# Patient Record
Sex: MALE | Race: WHITE | NOT HISPANIC OR LATINO | Employment: OTHER | URBAN - NONMETROPOLITAN AREA
[De-identification: names, ages, dates, MRNs, and addresses within clinical notes are randomized per-mention and may not be internally consistent; named-entity substitution may affect disease eponyms.]

---

## 2022-10-24 ENCOUNTER — OFFICE VISIT (OUTPATIENT)
Dept: CARDIOLOGY | Facility: CLINIC | Age: 72
End: 2022-10-24

## 2022-10-24 VITALS
BODY MASS INDEX: 32.07 KG/M2 | OXYGEN SATURATION: 97 % | HEART RATE: 72 BPM | SYSTOLIC BLOOD PRESSURE: 121 MMHG | HEIGHT: 72 IN | DIASTOLIC BLOOD PRESSURE: 81 MMHG | WEIGHT: 236.8 LBS

## 2022-10-24 DIAGNOSIS — I48.92 PAROXYSMAL ATRIAL FLUTTER: ICD-10-CM

## 2022-10-24 DIAGNOSIS — E78.2 MIXED HYPERLIPIDEMIA: ICD-10-CM

## 2022-10-24 DIAGNOSIS — R06.09 DOE (DYSPNEA ON EXERTION): ICD-10-CM

## 2022-10-24 DIAGNOSIS — I10 HYPERTENSION, ESSENTIAL: Primary | ICD-10-CM

## 2022-10-24 DIAGNOSIS — E11.69 TYPE 2 DIABETES MELLITUS WITH OTHER SPECIFIED COMPLICATION, WITHOUT LONG-TERM CURRENT USE OF INSULIN: ICD-10-CM

## 2022-10-24 PROBLEM — E11.9 DIABETES MELLITUS: Status: ACTIVE | Noted: 2022-10-24

## 2022-10-24 PROCEDURE — 99204 OFFICE O/P NEW MOD 45 MIN: CPT | Performed by: INTERNAL MEDICINE

## 2022-10-24 PROCEDURE — 93000 ELECTROCARDIOGRAM COMPLETE: CPT | Performed by: INTERNAL MEDICINE

## 2022-10-24 RX ORDER — TRIAMCINOLONE ACETONIDE 55 UG/1
2 SPRAY, METERED NASAL DAILY
COMMUNITY

## 2022-10-24 RX ORDER — METOPROLOL SUCCINATE 25 MG/1
25 TABLET, EXTENDED RELEASE ORAL DAILY
COMMUNITY
End: 2022-10-24 | Stop reason: SDUPTHER

## 2022-10-24 RX ORDER — FLECAINIDE ACETATE 50 MG/1
50 TABLET ORAL 2 TIMES DAILY
Qty: 180 TABLET | Refills: 3 | Status: ON HOLD | OUTPATIENT
Start: 2022-10-24 | End: 2022-12-13 | Stop reason: SDUPTHER

## 2022-10-24 RX ORDER — FAMOTIDINE 10 MG
10 TABLET ORAL DAILY
COMMUNITY

## 2022-10-24 RX ORDER — KETOCONAZOLE 20 MG/G
1 CREAM TOPICAL AS NEEDED
COMMUNITY

## 2022-10-24 RX ORDER — FOLIC ACID 1 MG/1
1 TABLET ORAL DAILY
COMMUNITY

## 2022-10-24 RX ORDER — LISINOPRIL 10 MG/1
10 TABLET ORAL DAILY
COMMUNITY
End: 2023-03-06

## 2022-10-24 RX ORDER — MULTIVIT WITH MINERALS/LUTEIN
1000 TABLET ORAL DAILY
COMMUNITY

## 2022-10-24 RX ORDER — MELATONIN
2000 DAILY
COMMUNITY

## 2022-10-24 RX ORDER — ZOLPIDEM TARTRATE 10 MG/1
10 TABLET ORAL NIGHTLY PRN
COMMUNITY

## 2022-10-24 RX ORDER — ATORVASTATIN CALCIUM 10 MG/1
10 TABLET, FILM COATED ORAL NIGHTLY
COMMUNITY

## 2022-10-24 RX ORDER — METOPROLOL SUCCINATE 25 MG/1
25 TABLET, EXTENDED RELEASE ORAL DAILY
Qty: 90 TABLET | Refills: 3 | Status: SHIPPED | OUTPATIENT
Start: 2022-10-24 | End: 2023-01-24 | Stop reason: DRUGHIGH

## 2022-10-24 NOTE — PROGRESS NOTES
OFFICE VISIT  NOTE  [facility] CARDIOLOGY [place] [facility]      Name: Anibal GRANADOS    Date: 10/24/2022  MRN:  [id number]  :  1950      REFERRING/PRIMARY PROVIDER:  Freeman Smith MD    Chief Complaint   Patient presents with   • Atrial Flutter     Consult, referred by Dr. Smith       HPI: Anibal GRANADOS is a 72 y.o. male who presents today for new onset typical atrial flutter.  Diagnosed at PCP 10/18/2022 started on metoprolol and Eliquis.  Noticed on his Fitbit that his resting heart rate was elevated, going from the low 60s up to the mid 80s, when he exercised on treadmill heart rate going up to 200s with some associated shortness of breath.  History of hypertension, hyperlipidemia, CKD has 1 kidney.  His youngest son passed away this year he has increased stress.    Past Medical History:   Diagnosis Date   • Atrial fibrillation (HCC)    • Chronic kidney disease    • Diabetes mellitus (HCC)    • Hyperlipidemia    • Hypertension    • Kidney disease     One kidney       Past Surgical History:   Procedure Laterality Date   • CARPAL TUNNEL RELEASE      x2   • CYSTOSCOPY TRANSURETHRAL RESECTION OF PROSTATE     • NEPHRECTOMY     • TENDON REPAIR         Social History     Socioeconomic History   • Marital status:    Tobacco Use   • Smoking status: Never   • Smokeless tobacco: Never   Vaping Use   • Vaping Use: Never used   Substance and Sexual Activity   • Alcohol use: Yes     Comment: rarely   • Drug use: Never   • Sexual activity: Defer       Family History   Problem Relation Age of Onset   • Heart disease Mother         ROS:   Constitutional no fever,  no weight loss   Skin no rash, no subcutaneous nodules   Otolaryngeal no difficulty swallowing   Cardiovascular See HPI   Pulmonary no cough, no sputum production   Gastrointestinal no constipation, no diarrhea   Genitourinary no dysuria, no hematuria   Hematologic no easy bruisability, no abnormal bleeding   Musculoskeletal no  "muscle pain   Neurologic no dizziness, no falls         Allergies   Allergen Reactions   • Penicillins Other (See Comments)     Childhood allergy         Current Outpatient Medications:   •  apixaban (ELIQUIS) 5 MG tablet tablet, Take 1 tablet by mouth 2 (Two) Times a Day., Disp: , Rfl:   •  ascorbic acid (VITAMIN C) 1000 MG tablet, Take 1 tablet by mouth Daily., Disp: , Rfl:   •  atorvastatin (LIPITOR) 10 MG tablet, Take 1 tablet by mouth Daily., Disp: , Rfl:   •  Cholecalciferol 25 MCG (1000 UT) tablet, Take 2 tablets by mouth Daily., Disp: , Rfl:   •  famotidine (PEPCID) 10 MG tablet, Take 4 tablets by mouth Daily., Disp: , Rfl:   •  fluocinonide (LIDEX) 0.05 % cream, Apply 1 application topically to the appropriate area as directed As Needed., Disp: , Rfl:   •  folic acid (FOLVITE) 1 MG tablet, Take 1 tablet by mouth Daily., Disp: , Rfl:   •  ketoconazole (NIZORAL) 2 % cream, Apply 1 application topically to the appropriate area as directed As Needed for Itching., Disp: , Rfl:   •  lisinopril (PRINIVIL,ZESTRIL) 10 MG tablet, Take 1 tablet by mouth Daily., Disp: , Rfl:   •  metFORMIN (GLUCOPHAGE) 850 MG tablet, Take 1 tablet by mouth 2 (Two) Times a Day With Meals., Disp: , Rfl:   •  metoprolol succinate XL (TOPROL-XL) 25 MG 24 hr tablet, Take 1 tablet by mouth Daily., Disp: , Rfl:   •  Triamcinolone Acetonide (NASACORT) 55 MCG/ACT nasal inhaler, 2 sprays into the nostril(s) as directed by provider Daily., Disp: , Rfl:   •  zolpidem (Ambien) 10 MG tablet, Take 1 tablet by mouth At Night As Needed for Sleep., Disp: , Rfl:     Vitals:    10/24/22 0922   BP: 121/81   BP Location: Left arm   Patient Position: Sitting   Cuff Size: Adult   Pulse: 72   SpO2: 97%   Weight: 107 kg (236 lb 12.8 oz)   Height: 182.9 cm (72\")     Body mass index is 32.12 kg/m².    PHYSICAL EXAM:    General Appearance:   · well developed  · well nourished  HENT:   · oropharynx moist  · lips not cyanotic  Neck:  · thyroid not " enlarged  · supple  Respiratory:  · no respiratory distress  · normal breath sounds  · no rales  Cardiovascular:  · no jugular venous distention  · regular rhythm  · apical impulse normal  · S1 normal, S2 normal  · no S3, no S4   · no murmur  · no rub, no thrill  · carotid pulses normal; no bruit  · lower extremity edema: none      Musculoskeletal:  · no clubbing of fingers.   · normocephalic, head atraumatic  Skin:   · warm, dry  Psychiatric:  · judgement and insight appropriate  · normal mood and affect    RESULTS:     ECG 12 Lead    Date/Time: 10/24/2022 9:53 AM  Performed by: Louis Hagen MD  Authorized by: Louis Hagen MD   Comparison: not compared with previous ECG   Previous ECG: no previous ECG available  Rhythm: atrial flutter  Rate: normal  BPM: 72  QRS axis: normal    Clinical impression: abnormal EKG                  Labs:  No results found for: CHOL, TRIG, HDL, LDL, AST, ALT  No results found for: HGBA1C  No components found for: CREATINININE  No results found for: EGFRIFNONA    Most recent PCP note, imaging tests, and labs reviewed.    ASSESSMENT:  Problem List Items Addressed This Visit        Cardiac and Vasculature    Paroxysmal atrial flutter (HCC)    Relevant Medications    metoprolol succinate XL (TOPROL-XL) 25 MG 24 hr tablet    Hypertension, essential - Primary    Relevant Medications    lisinopril (PRINIVIL,ZESTRIL) 10 MG tablet    metoprolol succinate XL (TOPROL-XL) 25 MG 24 hr tablet    Mixed hyperlipidemia    Relevant Medications    atorvastatin (LIPITOR) 10 MG tablet       Endocrine and Metabolic    Diabetes mellitus (HCC)    Relevant Medications    metFORMIN (GLUCOPHAGE) 850 MG tablet       PLAN:    1.  Typical atrial flutter:  Continue metoprolol and Eliquis  [id number] score is 3  Low overall bleeding risk    Start flecainide 50 mg twice daily, EKG in 3 days  Schedule ECV in 3 weeks  Check echocardiogram    2.  Hypertension:  Long-term goal blood pressure less than  130/80  Well-controlled on current regimen    3.  Hyperlipidemia:  Goal LDL less than 100  Increase aerobic exercise and low saturated fat diet recommended    4.  CKD stage II:  Has 1 kidney, monitor renal function closely with addition of flecainide    Advance Care Planning   ACP discussion was held with the patient during this visit. Patient does not have an advance directive, information provided.         Return to clinic in 4 months, or sooner as needed.    Thank you for the opportunity to share in the care of your patient; please do not hesitate to call me with any questions.     Louis Hagen MD, FACC  Office: [phone] [address] [place] [address]    10/24/22

## 2022-10-27 ENCOUNTER — CLINICAL SUPPORT (OUTPATIENT)
Dept: CARDIOLOGY | Facility: CLINIC | Age: 72
End: 2022-10-27

## 2022-10-27 DIAGNOSIS — I48.92 PAROXYSMAL ATRIAL FLUTTER: Primary | ICD-10-CM

## 2022-10-27 PROCEDURE — 93000 ELECTROCARDIOGRAM COMPLETE: CPT | Performed by: INTERNAL MEDICINE

## 2022-10-27 NOTE — PROGRESS NOTES
ECG 12 Lead    Date/Time: 10/27/2022 2:32 PM  Performed by: Gabino Mar MD  Authorized by: Gabino Mar MD   Comparison: compared with previous ECG from 10/26/2022  Similar to previous ECG  Rhythm: atrial flutter  Rate: normal  BPM: 80  QRS axis: normal    Clinical impression: abnormal EKG

## 2022-10-31 ENCOUNTER — TELEPHONE (OUTPATIENT)
Dept: CARDIOLOGY | Facility: CLINIC | Age: 72
End: 2022-10-31

## 2022-10-31 NOTE — TELEPHONE ENCOUNTER
Meg called to sched his ECV and ECHO but he thinks he needs to see Dr Hagen before then due to resting heart rate is back elevated again. Spoke with pt he has HR is 86. He is asymptomatic and able to exercise. Advised he needs to schedule ECV and Echo as soon as possible. Transferred back to Meg to schedule.

## 2022-11-01 ENCOUNTER — PREP FOR SURGERY (OUTPATIENT)
Dept: OTHER | Facility: HOSPITAL | Age: 72
End: 2022-11-01

## 2022-11-02 ENCOUNTER — HOSPITAL ENCOUNTER (OUTPATIENT)
Dept: CARDIOLOGY | Facility: HOSPITAL | Age: 72
Discharge: HOME | End: 2022-11-02
Attending: INTERNAL MEDICINE | Admitting: INTERNAL MEDICINE

## 2022-11-02 VITALS
BODY MASS INDEX: 31.59 KG/M2 | HEART RATE: 73 BPM | DIASTOLIC BLOOD PRESSURE: 89 MMHG | SYSTOLIC BLOOD PRESSURE: 125 MMHG | TEMPERATURE: 97.5 F | HEIGHT: 72 IN | WEIGHT: 233.2 LBS | OXYGEN SATURATION: 98 %

## 2022-11-02 DIAGNOSIS — I48.3 TYPICAL ATRIAL FLUTTER: ICD-10-CM

## 2022-11-02 LAB
ANION GAP SERPL CALCULATED.3IONS-SCNC: 12 MMOL/L (ref 5–15)
BUN SERPL-MCNC: 17 MG/DL (ref 8–23)
BUN/CREAT SERPL: 14.8 (ref 7–25)
CALCIUM SPEC-SCNC: 9.1 MG/DL (ref 8.6–10.5)
CHLORIDE SERPL-SCNC: 104 MMOL/L (ref 98–107)
CO2 SERPL-SCNC: 25 MMOL/L (ref 22–29)
CREAT SERPL-MCNC: 1.15 MG/DL (ref 0.76–1.27)
DEPRECATED RDW RBC AUTO: 42.5 FL (ref 37–54)
EGFRCR SERPLBLD CKD-EPI 2021: 68.5 ML/MIN/1.73
ERYTHROCYTE [DISTWIDTH] IN BLOOD BY AUTOMATED COUNT: 12.1 % (ref 12.3–15.4)
GLUCOSE SERPL-MCNC: 114 MG/DL (ref 65–99)
HCT VFR BLD AUTO: 51.6 % (ref 37.5–51)
HGB BLD-MCNC: 17.3 G/DL (ref 13–17.7)
MAXIMAL PREDICTED HEART RATE: 150 BPM
MCH RBC QN AUTO: 32.2 PG (ref 26.6–33)
MCHC RBC AUTO-ENTMCNC: 33.5 G/DL (ref 31.5–35.7)
MCV RBC AUTO: 96.1 FL (ref 79–97)
PLATELET # BLD AUTO: 181 10*3/MM3 (ref 140–450)
PMV BLD AUTO: 9.6 FL (ref 6–12)
POTASSIUM SERPL-SCNC: 4.5 MMOL/L (ref 3.5–5.2)
QT INTERVAL: 416 MS
QTC INTERVAL: 458 MS
RBC # BLD AUTO: 5.37 10*6/MM3 (ref 4.14–5.8)
SODIUM SERPL-SCNC: 141 MMOL/L (ref 136–145)
STRESS TARGET HR: 128 BPM
WBC NRBC COR # BLD: 8.01 10*3/MM3 (ref 3.4–10.8)

## 2022-11-02 PROCEDURE — 36415 COLL VENOUS BLD VENIPUNCTURE: CPT

## 2022-11-02 PROCEDURE — 93312 ECHO TRANSESOPHAGEAL: CPT | Performed by: INTERNAL MEDICINE

## 2022-11-02 PROCEDURE — 99152 MOD SED SAME PHYS/QHP 5/>YRS: CPT | Performed by: INTERNAL MEDICINE

## 2022-11-02 PROCEDURE — 93312 ECHO TRANSESOPHAGEAL: CPT

## 2022-11-02 PROCEDURE — 92960 CARDIOVERSION ELECTRIC EXT: CPT

## 2022-11-02 PROCEDURE — 93005 ELECTROCARDIOGRAM TRACING: CPT | Performed by: INTERNAL MEDICINE

## 2022-11-02 PROCEDURE — 92960 CARDIOVERSION ELECTRIC EXT: CPT | Performed by: INTERNAL MEDICINE

## 2022-11-02 PROCEDURE — 85027 COMPLETE CBC AUTOMATED: CPT | Performed by: INTERNAL MEDICINE

## 2022-11-02 PROCEDURE — 80048 BASIC METABOLIC PNL TOTAL CA: CPT | Performed by: INTERNAL MEDICINE

## 2022-11-02 PROCEDURE — 93321 DOPPLER ECHO F-UP/LMTD STD: CPT | Performed by: INTERNAL MEDICINE

## 2022-11-02 PROCEDURE — 93325 DOPPLER ECHO COLOR FLOW MAPG: CPT

## 2022-11-02 PROCEDURE — 93321 DOPPLER ECHO F-UP/LMTD STD: CPT

## 2022-11-02 PROCEDURE — 25010000002 MIDAZOLAM PER 1 MG: Performed by: INTERNAL MEDICINE

## 2022-11-02 PROCEDURE — 93325 DOPPLER ECHO COLOR FLOW MAPG: CPT | Performed by: INTERNAL MEDICINE

## 2022-11-02 RX ORDER — MIDAZOLAM HYDROCHLORIDE 1 MG/ML
8 INJECTION INTRAMUSCULAR; INTRAVENOUS ONCE AS NEEDED
Status: DISCONTINUED | OUTPATIENT
Start: 2022-11-02 | End: 2022-11-02 | Stop reason: HOSPADM

## 2022-11-02 RX ORDER — FLUMAZENIL 0.1 MG/ML
0.5 INJECTION INTRAVENOUS ONCE AS NEEDED
Status: DISCONTINUED | OUTPATIENT
Start: 2022-11-02 | End: 2022-11-02 | Stop reason: HOSPADM

## 2022-11-02 RX ORDER — FENTANYL CITRATE 50 UG/ML
100 INJECTION, SOLUTION INTRAMUSCULAR; INTRAVENOUS ONCE AS NEEDED
Status: DISCONTINUED | OUTPATIENT
Start: 2022-11-02 | End: 2022-11-02 | Stop reason: HOSPADM

## 2022-11-02 RX ORDER — MIDAZOLAM HYDROCHLORIDE 1 MG/ML
INJECTION INTRAMUSCULAR; INTRAVENOUS
Status: COMPLETED | OUTPATIENT
Start: 2022-11-02 | End: 2022-11-02

## 2022-11-02 RX ORDER — NALOXONE HCL 0.4 MG/ML
0.4 VIAL (ML) INJECTION ONCE AS NEEDED
Status: DISCONTINUED | OUTPATIENT
Start: 2022-11-02 | End: 2022-11-02 | Stop reason: HOSPADM

## 2022-11-02 RX ADMIN — MIDAZOLAM HYDROCHLORIDE 2 MG: 1 INJECTION, SOLUTION INTRAMUSCULAR; INTRAVENOUS at 09:49

## 2022-11-02 RX ADMIN — MIDAZOLAM HYDROCHLORIDE 2 MG: 1 INJECTION, SOLUTION INTRAMUSCULAR; INTRAVENOUS at 09:41

## 2022-11-02 NOTE — INTERVAL H&P NOTE
"  H&P reviewed. The patient was examined and there are no changes to the H&P.      Vitals and Labs today:    Vitals:    11/02/22 0721 11/02/22 0723   BP: 130/93 121/90   BP Location: Right arm Left arm   Patient Position: Lying    Pulse: 81    Temp: 97.5 °F (36.4 °C)    TempSrc: Temporal    SpO2: 96% 95%   Weight: 106 kg (233 lb 3.2 oz)    Height: 182.9 cm (72\")      Lab Results   Component Value Date    WBC 8.01 11/02/2022    HGB 17.3 11/02/2022    HCT 51.6 (H) 11/02/2022    MCV 96.1 11/02/2022     11/02/2022     Lab Results   Component Value Date    GLUCOSE 114 (H) 11/02/2022    BUN 17 11/02/2022    CREATININE 1.15 11/02/2022    BCR 14.8 11/02/2022    K 4.5 11/02/2022    CO2 25.0 11/02/2022    CALCIUM 9.1 11/02/2022     No results found for: HGBA1C  No results found for: CHOL, CHLPL, TRIG, HDL, LDL, LDLDIRECT  No results found for: TSH    Louis Hagen M.D., F.A.C.C.  Interventional Cardiology  11/02/22  08:27 EDT        "

## 2022-11-30 ENCOUNTER — TELEPHONE (OUTPATIENT)
Dept: CARDIOLOGY | Facility: CLINIC | Age: 72
End: 2022-11-30

## 2022-11-30 NOTE — TELEPHONE ENCOUNTER
Pt reporting HR has been in the 70-85. He's used to his resting HR being around 60, 120-150 with exercise. He checks HR on his FitBit, does not monitor BP. He is tolerating exercise well. He denies any cardiac symptoms at this time, compliant with all meds. Instructed these HR are normal but pt states he would feel better if he could come in and get an EKG tomorrow at [place] office. Advised this was fine, sent [facility] a msg letting them know. Advised I would call him after RDS reviews EKG. Pt verbalized understanding and agreeable to plan.

## 2022-12-01 ENCOUNTER — CLINICAL SUPPORT (OUTPATIENT)
Dept: CARDIOLOGY | Facility: CLINIC | Age: 72
End: 2022-12-01

## 2022-12-01 ENCOUNTER — TELEPHONE (OUTPATIENT)
Dept: CARDIOLOGY | Facility: CLINIC | Age: 72
End: 2022-12-01

## 2022-12-01 DIAGNOSIS — I48.92 PAROXYSMAL ATRIAL FLUTTER: Primary | ICD-10-CM

## 2022-12-01 PROCEDURE — 93000 ELECTROCARDIOGRAM COMPLETE: CPT | Performed by: INTERNAL MEDICINE

## 2022-12-01 NOTE — PROGRESS NOTES
ECG 12 Lead    Date/Time: 12/1/2022 12:24 PM  Performed by: Gabino Mar MD  Authorized by: Gabino Mar MD   Comparison: compared with previous ECG from 10/27/2022  Similar to previous ECG  Rhythm: atrial flutter  BPM: 83  Other findings: non-specific ST-T wave changes    Clinical impression: abnormal EKG

## 2022-12-01 NOTE — TELEPHONE ENCOUNTER
Per RDS after reviewing EKG completed in [place]- increase Flecainide to 100 mg BID (he will double his 50 mg tablets for now), refer to EP for atrial flutter, and schedule ECV the week of 12/12. He has continued to take Eliquis 5 mg BID and will not miss any doses. Pt agreeable to plan and verbalized understanding.

## 2022-12-05 ENCOUNTER — TRANSCRIBE ORDERS (OUTPATIENT)
Dept: CARDIOLOGY | Facility: CLINIC | Age: 72
End: 2022-12-05

## 2022-12-05 DIAGNOSIS — I48.92 PAROXYSMAL ATRIAL FLUTTER: Primary | ICD-10-CM

## 2022-12-09 ENCOUNTER — PREP FOR SURGERY (OUTPATIENT)
Dept: OTHER | Facility: HOSPITAL | Age: 72
End: 2022-12-09

## 2022-12-09 RX ORDER — ACETAMINOPHEN 325 MG/1
650 TABLET ORAL EVERY 4 HOURS PRN
Status: CANCELLED | OUTPATIENT
Start: 2022-12-09

## 2022-12-09 RX ORDER — SODIUM CHLORIDE 0.9 % (FLUSH) 0.9 %
10 SYRINGE (ML) INJECTION AS NEEDED
Status: CANCELLED | OUTPATIENT
Start: 2022-12-09

## 2022-12-09 RX ORDER — SODIUM CHLORIDE 9 MG/ML
40 INJECTION, SOLUTION INTRAVENOUS AS NEEDED
Status: CANCELLED | OUTPATIENT
Start: 2022-12-09

## 2022-12-09 RX ORDER — SODIUM CHLORIDE 0.9 % (FLUSH) 0.9 %
10 SYRINGE (ML) INJECTION EVERY 12 HOURS SCHEDULED
Status: CANCELLED | OUTPATIENT
Start: 2022-12-09

## 2022-12-13 ENCOUNTER — APPOINTMENT (OUTPATIENT)
Dept: CARDIOLOGY | Facility: HOSPITAL | Age: 72
End: 2022-12-13

## 2022-12-13 ENCOUNTER — HOSPITAL ENCOUNTER (OUTPATIENT)
Dept: CARDIOLOGY | Facility: HOSPITAL | Age: 72
Discharge: HOME | End: 2022-12-13
Attending: INTERNAL MEDICINE | Admitting: INTERNAL MEDICINE

## 2022-12-13 VITALS
HEART RATE: 70 BPM | WEIGHT: 233.4 LBS | DIASTOLIC BLOOD PRESSURE: 80 MMHG | TEMPERATURE: 97.4 F | SYSTOLIC BLOOD PRESSURE: 115 MMHG | OXYGEN SATURATION: 98 % | BODY MASS INDEX: 31.61 KG/M2 | HEIGHT: 72 IN | RESPIRATION RATE: 16 BRPM

## 2022-12-13 DIAGNOSIS — R07.9 CHEST PAIN, UNSPECIFIED TYPE: Primary | ICD-10-CM

## 2022-12-13 DIAGNOSIS — I10 HYPERTENSION, ESSENTIAL: ICD-10-CM

## 2022-12-13 DIAGNOSIS — I48.92 PAROXYSMAL ATRIAL FLUTTER: ICD-10-CM

## 2022-12-13 DIAGNOSIS — E78.2 MIXED HYPERLIPIDEMIA: ICD-10-CM

## 2022-12-13 LAB
ALBUMIN SERPL-MCNC: 4.5 G/DL (ref 3.5–5.2)
ALBUMIN/GLOB SERPL: 1.8 G/DL
ALP SERPL-CCNC: 77 U/L (ref 39–117)
ALT SERPL W P-5'-P-CCNC: 19 U/L (ref 1–41)
ANION GAP SERPL CALCULATED.3IONS-SCNC: 10 MMOL/L (ref 5–15)
AST SERPL-CCNC: 20 U/L (ref 1–40)
BILIRUB SERPL-MCNC: 0.8 MG/DL (ref 0–1.2)
BUN SERPL-MCNC: 16 MG/DL (ref 8–23)
BUN/CREAT SERPL: 14 (ref 7–25)
CALCIUM SPEC-SCNC: 9.5 MG/DL (ref 8.6–10.5)
CHLORIDE SERPL-SCNC: 104 MMOL/L (ref 98–107)
CHOLEST SERPL-MCNC: 137 MG/DL (ref 0–200)
CO2 SERPL-SCNC: 26 MMOL/L (ref 22–29)
CREAT SERPL-MCNC: 1.14 MG/DL (ref 0.76–1.27)
DEPRECATED RDW RBC AUTO: 40.8 FL (ref 37–54)
EGFRCR SERPLBLD CKD-EPI 2021: 69.2 ML/MIN/1.73
ERYTHROCYTE [DISTWIDTH] IN BLOOD BY AUTOMATED COUNT: 11.9 % (ref 12.3–15.4)
GLOBULIN UR ELPH-MCNC: 2.5 GM/DL
GLUCOSE SERPL-MCNC: 109 MG/DL (ref 65–99)
HCT VFR BLD AUTO: 52.9 % (ref 37.5–51)
HDLC SERPL-MCNC: 35 MG/DL (ref 40–60)
HGB BLD-MCNC: 17.7 G/DL (ref 13–17.7)
LDLC SERPL CALC-MCNC: 74 MG/DL (ref 0–100)
LDLC/HDLC SERPL: 1.97 {RATIO}
MAXIMAL PREDICTED HEART RATE: 150 BPM
MCH RBC QN AUTO: 31.6 PG (ref 26.6–33)
MCHC RBC AUTO-ENTMCNC: 33.5 G/DL (ref 31.5–35.7)
MCV RBC AUTO: 94.5 FL (ref 79–97)
PLATELET # BLD AUTO: 186 10*3/MM3 (ref 140–450)
PMV BLD AUTO: 9.5 FL (ref 6–12)
POTASSIUM SERPL-SCNC: 5.2 MMOL/L (ref 3.5–5.2)
PROT SERPL-MCNC: 7 G/DL (ref 6–8.5)
RBC # BLD AUTO: 5.6 10*6/MM3 (ref 4.14–5.8)
SODIUM SERPL-SCNC: 140 MMOL/L (ref 136–145)
STRESS TARGET HR: 128 BPM
TRIGL SERPL-MCNC: 165 MG/DL (ref 0–150)
TSH SERPL DL<=0.05 MIU/L-ACNC: 2.16 UIU/ML (ref 0.27–4.2)
VLDLC SERPL-MCNC: 28 MG/DL (ref 5–40)
WBC NRBC COR # BLD: 7.83 10*3/MM3 (ref 3.4–10.8)

## 2022-12-13 PROCEDURE — 93005 ELECTROCARDIOGRAM TRACING: CPT | Performed by: INTERNAL MEDICINE

## 2022-12-13 PROCEDURE — 85027 COMPLETE CBC AUTOMATED: CPT | Performed by: PHYSICIAN ASSISTANT

## 2022-12-13 PROCEDURE — 80053 COMPREHEN METABOLIC PANEL: CPT | Performed by: PHYSICIAN ASSISTANT

## 2022-12-13 PROCEDURE — 84443 ASSAY THYROID STIM HORMONE: CPT | Performed by: PHYSICIAN ASSISTANT

## 2022-12-13 PROCEDURE — 99152 MOD SED SAME PHYS/QHP 5/>YRS: CPT | Performed by: INTERNAL MEDICINE

## 2022-12-13 PROCEDURE — 36415 COLL VENOUS BLD VENIPUNCTURE: CPT

## 2022-12-13 PROCEDURE — 25010000002 MIDAZOLAM PER 1 MG: Performed by: INTERNAL MEDICINE

## 2022-12-13 PROCEDURE — 92960 CARDIOVERSION ELECTRIC EXT: CPT

## 2022-12-13 PROCEDURE — 80061 LIPID PANEL: CPT | Performed by: PHYSICIAN ASSISTANT

## 2022-12-13 PROCEDURE — 92960 CARDIOVERSION ELECTRIC EXT: CPT | Performed by: INTERNAL MEDICINE

## 2022-12-13 RX ORDER — SODIUM CHLORIDE 9 MG/ML
40 INJECTION, SOLUTION INTRAVENOUS AS NEEDED
Status: DISCONTINUED | OUTPATIENT
Start: 2022-12-13 | End: 2022-12-13 | Stop reason: HOSPADM

## 2022-12-13 RX ORDER — DIAPER,BRIEF,INFANT-TODD,DISP
1 EACH MISCELLANEOUS EVERY 8 HOURS SCHEDULED
Status: DISCONTINUED | OUTPATIENT
Start: 2022-12-13 | End: 2022-12-13 | Stop reason: HOSPADM

## 2022-12-13 RX ORDER — SODIUM CHLORIDE 0.9 % (FLUSH) 0.9 %
10 SYRINGE (ML) INJECTION EVERY 12 HOURS SCHEDULED
Status: DISCONTINUED | OUTPATIENT
Start: 2022-12-13 | End: 2022-12-13 | Stop reason: HOSPADM

## 2022-12-13 RX ORDER — FLUMAZENIL 0.1 MG/ML
0.5 INJECTION INTRAVENOUS ONCE AS NEEDED
Status: DISCONTINUED | OUTPATIENT
Start: 2022-12-13 | End: 2022-12-13 | Stop reason: HOSPADM

## 2022-12-13 RX ORDER — MIDAZOLAM HYDROCHLORIDE 1 MG/ML
2-8 INJECTION INTRAMUSCULAR; INTRAVENOUS ONCE AS NEEDED
Status: DISCONTINUED | OUTPATIENT
Start: 2022-12-13 | End: 2022-12-13 | Stop reason: HOSPADM

## 2022-12-13 RX ORDER — MIDAZOLAM HYDROCHLORIDE 1 MG/ML
INJECTION INTRAMUSCULAR; INTRAVENOUS
Status: COMPLETED | OUTPATIENT
Start: 2022-12-13 | End: 2022-12-13

## 2022-12-13 RX ORDER — NALOXONE HCL 0.4 MG/ML
0.4 VIAL (ML) INJECTION ONCE AS NEEDED
Status: DISCONTINUED | OUTPATIENT
Start: 2022-12-13 | End: 2022-12-13 | Stop reason: HOSPADM

## 2022-12-13 RX ORDER — ACETAMINOPHEN 325 MG/1
650 TABLET ORAL EVERY 4 HOURS PRN
Status: DISCONTINUED | OUTPATIENT
Start: 2022-12-13 | End: 2022-12-13 | Stop reason: HOSPADM

## 2022-12-13 RX ORDER — SODIUM CHLORIDE 0.9 % (FLUSH) 0.9 %
10 SYRINGE (ML) INJECTION AS NEEDED
Status: DISCONTINUED | OUTPATIENT
Start: 2022-12-13 | End: 2022-12-13 | Stop reason: HOSPADM

## 2022-12-13 RX ORDER — FLECAINIDE ACETATE 50 MG/1
50 TABLET ORAL 2 TIMES DAILY
Qty: 180 TABLET | Refills: 3 | Status: SHIPPED | OUTPATIENT
Start: 2022-12-13

## 2022-12-13 RX ORDER — FENTANYL CITRATE 50 UG/ML
50-100 INJECTION, SOLUTION INTRAMUSCULAR; INTRAVENOUS ONCE AS NEEDED
Status: DISCONTINUED | OUTPATIENT
Start: 2022-12-13 | End: 2022-12-13 | Stop reason: HOSPADM

## 2022-12-13 RX ADMIN — MIDAZOLAM 2 MG: 1 INJECTION INTRAMUSCULAR; INTRAVENOUS at 12:10

## 2022-12-13 NOTE — H&P
Pre-procedure History and Physical  [facility]      Patient:  Anibal Anderson  :  1950  MRN: [id number]    PCP:  Freeman Smith MD  PHONE:  [phone]    DATE: 2022  ID: Anibal Anderson is a 72 y.o. male resident of [place], [place]     CC: Atrial flutter    PROBLEM LIST:   Active Hospital Problems    Diagnosis  POA    Paroxysmal atrial flutter (HCC) [I48.92]  Yes    Diabetes mellitus (HCC) [E11.9]  Yes    Hypertension, essential [I10]  Yes    Mixed hyperlipidemia [E78.2]  Yes     BRIEF HPI: Mr. Anderson is a 71 y/o male with PAF, HTN, HLD and CKD II who presents for elective cardioversion today. He was recently seen in our offices for new onset Atrial flutter diagnosed in 2022, and elevated HRs on Fitbit watch.  He was started on Flecainide 50mg BID in addition to Metoprolol and Eliquis, and underwent successful MAIK/ECV on 22. He had return of atrial flutter and his Flecainide was increased to 100mg BID earlier this month. Repeat cardioversion was recommended and he also was referred to EP and has upcoming appt in January with Dr. Shaffer. He is active at home and walks 5 miles daily on a treadmill. He states since his Flecainide dose was doubled, he has noted midsternal chest pressure with walking on the treadmill. He is complaint with Eliquis and denies any missed doses in the past 30 days.       Allergies:      Allergies   Allergen Reactions    Penicillins Other (See Comments)     Childhood allergy       MEDICATIONS:  Current Outpatient Medications   Medication Instructions    apixaban (ELIQUIS) 5 mg, Oral, 2 Times Daily    ascorbic acid (VITAMIN C) 1,000 mg, Oral, Daily    atorvastatin (LIPITOR) 10 mg, Oral, Daily    cholecalciferol (VITAMIN D3) 2,000 Units, Oral, Daily    famotidine (PEPCID) 40 mg, Oral, Daily    flecainide (TAMBOCOR) 50 mg, Oral, 2 Times Daily    fluocinonide (LIDEX) 0.05 % cream 1 application, Topical, As Needed    folic  "acid (FOLVITE) 1 mg, Oral, Daily    ketoconazole (NIZORAL) 2 % cream 1 application, Topical, As Needed    lisinopril (PRINIVIL,ZESTRIL) 10 mg, Oral, Daily    metFORMIN (GLUCOPHAGE) 850 mg, Oral, 2 Times Daily With Meals    metoprolol succinate XL (TOPROL-XL) 25 mg, Oral, Daily    Triamcinolone Acetonide (NASACORT) 55 MCG/ACT nasal inhaler 2 sprays, Nasal, Daily    zolpidem (AMBIEN) 10 mg, Oral, Nightly PRN     Past medical & surgical history, social and family history reviewed in the electronic medical record.    ROS: Pertinent positives listed in the HPI and problem list above. All others reviewed and negative.     Physical Exam:   /96 (BP Location: Left arm, Patient Position: Sitting)   Pulse 65   Temp 97.4 °F (36.3 °C) (Temporal)   Resp 16   Ht 182.9 cm (72\")   Wt 106 kg (233 lb 6.4 oz)   SpO2 98%   BMI 31.65 kg/m²     Constitutional:    Alert, cooperative, in no acute distress   Neck:     No Jugular venous distention, adenopathy, or thyromegaly noted.    Heart:    Irregular rhythm and normal rate, normal S1 and S2, no murmurs,gallops, rubs, or clicks. No distinct PMI noted.    Lungs:     Clear to auscultation bilaterally, respirations regular, even and unlabored    Abdomen:     Soft nontender, nondistended, normal bowel sounds   Extremities:   No gross deformities, no edema, clubbing, or cyanosis.      Labs and Diagnostic Data:  Results from last 7 days   Lab Units 12/13/22  1024   SODIUM mmol/L 140   POTASSIUM mmol/L 5.2   CHLORIDE mmol/L 104   CO2 mmol/L 26.0   BUN mg/dL 16   CREATININE mg/dL 1.14   GLUCOSE mg/dL 109*   CALCIUM mg/dL 9.5     Results from last 7 days   Lab Units 12/13/22  1024   WBC 10*3/mm3 7.83   HEMOGLOBIN g/dL 17.7   HEMATOCRIT % 52.9*   PLATELETS 10*3/mm3 186     Lab Results   Component Value Date    CHOL 137 12/13/2022    TRIG 165 (H) 12/13/2022    HDL 35 (L) 12/13/2022    LDL 74 12/13/2022    AST 20 12/13/2022    ALT 19 12/13/2022                 Tele: Atrial flutter, rates " 70s    IMPRESSION:  72 y.o male with HTN, HLD, CKD and recently diagnosed atrial flutter who presents for ECV today.   He has been anticoagulated with Eliquis since mid October and denies any missed doses in the past 30 days.   Will order stress MPS as OP due to recent chest tightness with activity and concurrent Flecainide use     PLAN:  Procedure to perform: ECV. Risks, benefits and alternatives to the procedure explained to the patient and he understands and wishes to proceed.       Electronically signed by Annie Peterson PA-C, 12/13/22, 10:26 AM EST.  Louis Hagen M.D., F.A.C.C.  Interventional Cardiology  12/13/22  17:20 EST

## 2022-12-15 LAB
QT INTERVAL: 446 MS
QTC INTERVAL: 484 MS

## 2022-12-19 ENCOUNTER — TELEPHONE (OUTPATIENT)
Dept: CARDIOLOGY | Facility: CLINIC | Age: 72
End: 2022-12-19

## 2022-12-19 NOTE — TELEPHONE ENCOUNTER
Pt was unable to  flecainide due to insurance won't pay too soon to fill. Sent in [employer] card for 1 month for $18.00 till he can fill it again.

## 2023-01-05 ENCOUNTER — HOSPITAL ENCOUNTER (OUTPATIENT)
Dept: CARDIOLOGY | Facility: HOSPITAL | Age: 73
Discharge: HOME | End: 2023-01-05
Admitting: INTERNAL MEDICINE
Payer: MEDICARE

## 2023-01-05 VITALS
WEIGHT: 233 LBS | OXYGEN SATURATION: 99 % | SYSTOLIC BLOOD PRESSURE: 173 MMHG | HEART RATE: 65 BPM | BODY MASS INDEX: 31.56 KG/M2 | HEIGHT: 72 IN | DIASTOLIC BLOOD PRESSURE: 74 MMHG

## 2023-01-05 DIAGNOSIS — E78.2 MIXED HYPERLIPIDEMIA: ICD-10-CM

## 2023-01-05 DIAGNOSIS — R07.9 CHEST PAIN, UNSPECIFIED TYPE: ICD-10-CM

## 2023-01-05 DIAGNOSIS — I48.92 PAROXYSMAL ATRIAL FLUTTER: ICD-10-CM

## 2023-01-05 DIAGNOSIS — R06.09 DOE (DYSPNEA ON EXERTION): ICD-10-CM

## 2023-01-05 DIAGNOSIS — I10 HYPERTENSION, ESSENTIAL: ICD-10-CM

## 2023-01-05 LAB
BH CV REST NUCLEAR ISOTOPE DOSE: 29.9 MCI
BH CV STRESS BP STAGE 1: NORMAL
BH CV STRESS BP STAGE 3: NORMAL
BH CV STRESS COMMENTS STAGE 1: NORMAL
BH CV STRESS DOSE REGADENOSON STAGE 1: 0.4
BH CV STRESS DURATION MIN STAGE 1: 1
BH CV STRESS DURATION MIN STAGE 2: 1
BH CV STRESS DURATION MIN STAGE 3: 1
BH CV STRESS DURATION MIN STAGE 4: 1
BH CV STRESS DURATION SEC STAGE 2: 0
BH CV STRESS HR STAGE 1: 72
BH CV STRESS HR STAGE 2: 88
BH CV STRESS HR STAGE 3: 83
BH CV STRESS HR STAGE 4: 82
BH CV STRESS NUCLEAR ISOTOPE DOSE: 30 MCI
BH CV STRESS O2 STAGE 2: 96
BH CV STRESS O2 STAGE 3: 98
BH CV STRESS O2 STAGE 4: 98
BH CV STRESS PROTOCOL 1: NORMAL
BH CV STRESS RECOVERY BP: NORMAL MMHG
BH CV STRESS RECOVERY HR: 76 BPM
BH CV STRESS RECOVERY O2: 98 %
BH CV STRESS STAGE 1: 1
BH CV STRESS STAGE 2: 2
BH CV STRESS STAGE 3: 3
BH CV STRESS STAGE 4: 4
MAXIMAL PREDICTED HEART RATE: 150 BPM
PERCENT MAX PREDICTED HR: 60 %
STRESS BASELINE BP: NORMAL MMHG
STRESS BASELINE HR: 65 BPM
STRESS O2 SAT REST: 96 %
STRESS PERCENT HR: 71 %
STRESS POST ESTIMATED WORKLOAD: 1 METS
STRESS POST EXERCISE DUR MIN: 4 MIN
STRESS POST EXERCISE DUR SEC: 0 SEC
STRESS POST O2 SAT PEAK: 98 %
STRESS POST PEAK BP: NORMAL MMHG
STRESS POST PEAK HR: 90 BPM
STRESS TARGET HR: 128 BPM

## 2023-01-05 PROCEDURE — A9555 RB82 RUBIDIUM: HCPCS | Performed by: INTERNAL MEDICINE

## 2023-01-05 PROCEDURE — 25010000002 REGADENOSON 0.4 MG/5ML SOLUTION: Performed by: INTERNAL MEDICINE

## 2023-01-05 PROCEDURE — 0 RUBIDIUM CHLORIDE: Performed by: INTERNAL MEDICINE

## 2023-01-05 PROCEDURE — 93017 CV STRESS TEST TRACING ONLY: CPT

## 2023-01-05 PROCEDURE — 93018 CV STRESS TEST I&R ONLY: CPT | Performed by: INTERNAL MEDICINE

## 2023-01-05 PROCEDURE — 78431 MYOCRD IMG PET RST&STRS CT: CPT

## 2023-01-05 PROCEDURE — 78431 MYOCRD IMG PET RST&STRS CT: CPT | Performed by: INTERNAL MEDICINE

## 2023-01-05 RX ADMIN — RUBIDIUM CHLORIDE RB-82 1 DOSE: 150 INJECTION, SOLUTION INTRAVENOUS at 12:16

## 2023-01-05 RX ADMIN — RUBIDIUM CHLORIDE RB-82 1 DOSE: 150 INJECTION, SOLUTION INTRAVENOUS at 12:05

## 2023-01-05 RX ADMIN — REGADENOSON 0.4 MG: 0.08 INJECTION, SOLUTION INTRAVENOUS at 12:14

## 2023-01-10 ENCOUNTER — TELEPHONE (OUTPATIENT)
Dept: CARDIOLOGY | Facility: CLINIC | Age: 73
End: 2023-01-10
Payer: MEDICARE

## 2023-01-10 NOTE — TELEPHONE ENCOUNTER
----- Message from Louis Hagen MD sent at 1/5/2023  5:07 PM EST -----  Please inform the patient of their test results. Thank you.

## 2023-01-17 ENCOUNTER — TELEPHONE (OUTPATIENT)
Dept: CARDIOLOGY | Facility: CLINIC | Age: 73
End: 2023-01-17
Payer: MEDICARE

## 2023-01-17 NOTE — TELEPHONE ENCOUNTER
"Pt is on vacaction in [place], [place] and feels his heart out of rhythm HR around . Pt had a root canal on 1/12, was given Novocain, 2 hours later his HR was \"off the charts\". Normal HR with minimal exertion in the 70's. Since the root canal HR with minimal exertion gets above 100, resting HR 90's. He is easily short winded. He wasn't sure what to do while he is in [place], he will be back on Friday 1/20. Appt with Dr. Shaffer on 1/23. He'd like guidelines on when he should \"start to worry\". Advised his heart rate shouldn't stay above 100 at rest, if HR stays elevated, and he cannot catch his breath even while resting to proceed to the ER. He is going to take an extra Toprol 25 mg this afternoon to see if this helps with HR. He will call me on Friday to update. Pt verbalized understanding and agreeable to plan    "

## 2023-01-20 NOTE — TELEPHONE ENCOUNTER
Pt returning call and states increasing metoprolol has been helping, HR around 80's. He is going to continue Toprol 25 mg BID until he sees Dr. Shaffer on Monday and monitor BP and HR bring to that appt. Advised if Dr. Shaffer wants him to continue to have them send in new RX. He does have enough to double over the weekend. No further questions at this time.

## 2023-01-23 ENCOUNTER — OFFICE VISIT (OUTPATIENT)
Dept: CARDIOLOGY | Facility: CLINIC | Age: 73
End: 2023-01-23
Payer: MEDICARE

## 2023-01-23 VITALS
BODY MASS INDEX: 32.32 KG/M2 | SYSTOLIC BLOOD PRESSURE: 130 MMHG | OXYGEN SATURATION: 97 % | WEIGHT: 238.6 LBS | DIASTOLIC BLOOD PRESSURE: 82 MMHG | HEIGHT: 72 IN | HEART RATE: 88 BPM

## 2023-01-23 DIAGNOSIS — Z91.89 AT RISK FOR SLEEP APNEA: ICD-10-CM

## 2023-01-23 DIAGNOSIS — I10 HYPERTENSION, ESSENTIAL: ICD-10-CM

## 2023-01-23 DIAGNOSIS — I48.92 PAROXYSMAL ATRIAL FLUTTER: Primary | ICD-10-CM

## 2023-01-23 DIAGNOSIS — Z79.01 CHRONIC ANTICOAGULATION: ICD-10-CM

## 2023-01-23 PROCEDURE — 99204 OFFICE O/P NEW MOD 45 MIN: CPT | Performed by: INTERNAL MEDICINE

## 2023-01-23 NOTE — PROGRESS NOTES
"        Cardiac Electrophysiology Outpatient Consult Note            [facility] at [facility]    Consult Note     Anibal Anderson  [id number]  01/23/2023  [phone]     Primary Care Physician: Freeman Smith MD    Referred By: Louis Hagen MD    Subjective     Chief Complaint:   Diagnoses and all orders for this visit:    1. Paroxysmal atrial flutter (HCC) (Primary)    2. At risk for sleep apnea    3. Hypertension, essential    4. Chronic anticoagulation      Chief Complaint   Patient presents with   • Atrial Fibrillation     New Patient - Referred By: Louis Hagen       History of Present Illness:   Anibal Anderson is a 72 y.o. male who presents to my electrophysiology clinic for evaluation of paroxysmal atrial flutter.  His first atrial flutter was October 2022 which was converted to sinus rhythm after transesophageal echocardiogram and successful ECV.  He was started on flecainide and metoprolol.  He states this lasted for approximately 3 weeks with return of symptoms.  He had a second ECV last December which he says lasted for approximately 4 weeks.  Between the cardioversions his flecainide was increased to 100 mg byte twice daily unfortunately, this was not well-tolerated and was reduced back to 50.  He is mostly unaware of his rate and rhythm.  His wife notices that he does not sleep as well when he is out of rhythm.  He exercises on a near daily basis and states that his \"heart rate goes crazy\".  He is not diabetic, has no history of TIA or CVA no history of CHF or known vascular disease.  He does not check his blood pressure at home.  He had a remote sleep study the results of which are unknown.  His wife states that he snores heavily and has obvious apneic episodes while sleeping.  He also admits to daytime sleepiness.  He is tolerating anticoagulation.    Past Medical History:   Patient Active Problem List    Diagnosis Date Noted   • Paroxysmal atrial flutter (HCC) " 10/24/2022     Priority: High     Note Last Updated: 1/23/2023     · MAIK with ECV, 11/2/2022: Left ventricular ejection fraction appears to be 56 - 60%. Mild left atrial enlargement  · ECV to sinus rhythm, 12/13/2022  · MPS, 1/5/2023:  Myocardial perfusion imaging indicates a normal myocardial perfusion study with no evidence of ischemia. Left ventricular ejection fraction is normal.     • Chronic anticoagulation 01/23/2023     Priority: Low   • Hypertension, essential 10/24/2022     Priority: Low   • Mixed hyperlipidemia 10/24/2022       Past Surgical History:   Past Surgical History:   Procedure Laterality Date   • CARPAL TUNNEL RELEASE      x2   • CYSTOSCOPY TRANSURETHRAL RESECTION OF PROSTATE     • NEPHRECTOMY     • TENDON REPAIR         Social History:   Social History     Socioeconomic History   • Marital status:    Tobacco Use   • Smoking status: Never   • Smokeless tobacco: Never   Vaping Use   • Vaping Use: Never used   Substance and Sexual Activity   • Alcohol use: Not Currently     Comment: Occasionally 1 or 2 beers   • Drug use: Never   • Sexual activity: Not Currently     Partners: Female       Medications:     Current Outpatient Medications:   •  apixaban (ELIQUIS) 5 MG tablet tablet, Take 1 tablet by mouth 2 (Two) Times a Day., Disp: 180 tablet, Rfl: 3  •  ascorbic acid (VITAMIN C) 1000 MG tablet, Take 1 tablet by mouth Daily., Disp: , Rfl:   •  atorvastatin (LIPITOR) 10 MG tablet, Take 1 tablet by mouth Daily., Disp: , Rfl:   •  cholecalciferol (VITAMIN D3) 25 MCG (1000 UT) tablet, Take 2 tablets by mouth Daily., Disp: , Rfl:   •  famotidine (PEPCID) 10 MG tablet, Take 4 tablets by mouth Daily., Disp: , Rfl:   •  flecainide (TAMBOCOR) 50 MG tablet, Take 1 tablet by mouth 2 (Two) Times a Day., Disp: 180 tablet, Rfl: 3  •  fluocinonide (LIDEX) 0.05 % cream, Apply 1 application topically to the appropriate area as directed As Needed., Disp: , Rfl:   •  folic acid (FOLVITE) 1 MG tablet, Take 1  "tablet by mouth Daily., Disp: , Rfl:   •  ketoconazole (NIZORAL) 2 % cream, Apply 1 application topically to the appropriate area as directed As Needed for Itching., Disp: , Rfl:   •  lisinopril (PRINIVIL,ZESTRIL) 10 MG tablet, Take 1 tablet by mouth Daily., Disp: , Rfl:   •  metFORMIN (GLUCOPHAGE) 850 MG tablet, Take 1 tablet by mouth 2 (Two) Times a Day With Meals., Disp: , Rfl:   •  metoprolol succinate XL (TOPROL-XL) 25 MG 24 hr tablet, Take 1 tablet by mouth Daily. (Patient taking differently: Take 25 mg by mouth 2 (Two) Times a Day.), Disp: 90 tablet, Rfl: 3  •  Triamcinolone Acetonide (NASACORT) 55 MCG/ACT nasal inhaler, 2 sprays into the nostril(s) as directed by provider Daily., Disp: , Rfl:   •  zolpidem (AMBIEN) 10 MG tablet, Take 1 tablet by mouth At Night As Needed for Sleep., Disp: , Rfl:     Allergies:   Allergies   Allergen Reactions   • Penicillins Other (See Comments)     Childhood allergy       Objective   Vital Signs:   Vitals:    01/23/23 1254   BP: 130/82   BP Location: Left arm   Patient Position: Sitting   Pulse: 88   SpO2: 97%   Weight: 108 kg (238 lb 9.6 oz)   Height: 182.9 cm (72.01\")       PHYSICAL EXAM    Neck: no adenopathy, no carotid bruit, no JVD and thyroid: not enlarged  Lungs: clear to auscultation bilaterally and no rhonchi or crackles\", ' symmetric  Heart: regular rate and rhythm, S1, S2 normal, no murmur, click, rub or gallop  Abdomen: Soft, non-tender, bowel sounds normal,  no organomegaly  Extremities: extremities normal, atraumatic, no cyanosis or edema      Lab Results   Component Value Date    GLUCOSE 109 (H) 12/13/2022    CALCIUM 9.5 12/13/2022     12/13/2022    K 5.2 12/13/2022    CO2 26.0 12/13/2022     12/13/2022    BUN 16 12/13/2022    CREATININE 1.14 12/13/2022    BCR 14.0 12/13/2022    ANIONGAP 10.0 12/13/2022     Lab Results   Component Value Date    WBC 7.83 12/13/2022    HGB 17.7 12/13/2022    HCT 52.9 (H) 12/13/2022    MCV 94.5 12/13/2022     " 12/13/2022     No results found for: INR, PROTIME  Lab Results   Component Value Date    TSH 2.160 12/13/2022       Cardiac Testing:    CHADS-VASc Risk Assessment            2 Total Score    1 Hypertension         1 Age 65-74        Criteria that do not apply:    CHF    Age >/= 75    PRIOR STROKE/TIA/THROMBO    Vascular Disease    Sex: Female          I personally viewed and interpreted the patient's EKG/Telemetry/lab data    Procedures    Tobacco Cessation: none smoker    Obstructive Sleep Apnea Screening: Completed    Assessment & Plan    Diagnoses and all orders for this visit:    1. Paroxysmal atrial flutter (HCC) (Primary)    2. At risk for sleep apnea    3. Hypertension, essential    4. Chronic anticoagulation         Diagnosis Plan   1. Paroxysmal atrial flutter (HCC)   highly symptomatic recurrent paroxysmal typical right atrial flutter.    Tolerating blood thinner well.    Discussed with him the option of catheter ablation which would eliminate his risk of stroke from atrial flutter and also prevent future symptoms.  Reviewed the risk-benefit profile extensively.  Quoted him a less than 1% chance of significant procedure complication include but not limited to bleeding at the groin cardiac perforation tamponade stroke myocardial infarction and death.  He understands risk and wished to proceed.    Uninterrupted anticoagulation for his ablation.  Rhythmia  Moderate sedation    The patient and I made a mutually shared decision ( shared decision making model ) that the patient would be best served by proceeding with the above invasive heart procedure.  This is a high risk invasive cardiac procedure which comes with significant risk of morbidity and mortality.  This patient has significant underlying  heart disease.  Anibal Anderson understands these risks and   has made an informed decision to proceed.        2. At risk for sleep apnea   referral for sleep medicine      3. Hypertension, essential   blood  pressure well controlled      4. Chronic anticoagulation   atrial flutter is indication.  [id number] elevated        Body mass index is 32.35 kg/m².    I spent 48 minutes in consultation with this patient which included more than 65% of this time in direct face-to-face counseling, physical examination and discussion of my assessment and findings and shared decision making with the patient.  The remainder of the time not spent face to face was performing one, some or all of the following actions:  preparing to see this patient ( eg. Review of tests),  ordering medications, tests or procedures ), care coordination, discussion of the plan with other healthcare providers, documenting clinical information in Epic well as independently interpreting results and communicating results to patient, family and or caregiver.  All time noted occurred on the date of service.    Follow Up:       Thank you for allowing me to participate in the care of your patient. Please to not hesitate to contact me with additional questions or concerns.      Roc Shaffer, DO, FACC, RS  Cardiac Electrophysiologist  [facility] / [facility]    Electronically signed by JUDE Hewitt, 01/23/23, 8:23 AM EST.

## 2023-01-24 RX ORDER — METOPROLOL SUCCINATE 25 MG/1
25 TABLET, EXTENDED RELEASE ORAL 2 TIMES DAILY
Qty: 180 TABLET | Refills: 3 | Status: SHIPPED | OUTPATIENT
Start: 2023-01-24 | End: 2023-03-06

## 2023-01-24 NOTE — PROGRESS NOTES
Pt states he saw Dr. Shaffer yesterday and was told he may remain on Toprol 25 mg BID. This was temporaliy changed on 1/17 when he was in [place] and HR was aroudn . He called back on 1/30- he reported increasing Toprol to 25 mg BID has been helping, HR around 80's. New dose sent to pts requested pharmacy.

## 2023-01-25 ENCOUNTER — PREP FOR SURGERY (OUTPATIENT)
Dept: OTHER | Facility: HOSPITAL | Age: 73
End: 2023-01-25
Payer: MEDICARE

## 2023-01-25 DIAGNOSIS — I48.92 PAROXYSMAL ATRIAL FLUTTER: Primary | ICD-10-CM

## 2023-01-25 RX ORDER — ACETAMINOPHEN 325 MG/1
650 TABLET ORAL EVERY 4 HOURS PRN
Status: CANCELLED | OUTPATIENT
Start: 2023-01-25

## 2023-01-25 RX ORDER — ONDANSETRON 2 MG/ML
4 INJECTION INTRAMUSCULAR; INTRAVENOUS EVERY 6 HOURS PRN
Status: CANCELLED | OUTPATIENT
Start: 2023-01-25

## 2023-01-25 RX ORDER — SODIUM CHLORIDE 9 MG/ML
40 INJECTION, SOLUTION INTRAVENOUS AS NEEDED
Status: CANCELLED | OUTPATIENT
Start: 2023-01-25

## 2023-01-25 RX ORDER — NITROGLYCERIN 0.4 MG/1
0.4 TABLET SUBLINGUAL
Status: CANCELLED | OUTPATIENT
Start: 2023-01-25

## 2023-02-01 ENCOUNTER — OFFICE VISIT (OUTPATIENT)
Dept: SLEEP MEDICINE | Facility: HOSPITAL | Age: 73
End: 2023-02-01
Payer: MEDICARE

## 2023-02-01 VITALS
OXYGEN SATURATION: 97 % | WEIGHT: 235 LBS | DIASTOLIC BLOOD PRESSURE: 76 MMHG | BODY MASS INDEX: 31.83 KG/M2 | HEART RATE: 85 BPM | HEIGHT: 72 IN | SYSTOLIC BLOOD PRESSURE: 138 MMHG

## 2023-02-01 DIAGNOSIS — G47.33 OBSTRUCTIVE SLEEP APNEA, ADULT: Primary | ICD-10-CM

## 2023-02-01 DIAGNOSIS — G47.19 EXCESSIVE DAYTIME SLEEPINESS: ICD-10-CM

## 2023-02-01 DIAGNOSIS — R06.83 SNORING: ICD-10-CM

## 2023-02-01 PROBLEM — H25.9 AGE-RELATED CATARACT OF BOTH EYES: Status: ACTIVE | Noted: 2022-10-19

## 2023-02-01 PROCEDURE — 99204 OFFICE O/P NEW MOD 45 MIN: CPT | Performed by: INTERNAL MEDICINE

## 2023-02-01 NOTE — PROGRESS NOTES
Anibal Anderson is a 72 y.o. male.   Chief Complaint   Patient presents with   • Sleeping Problem       HPI     72 y.o. male seen in consultation at the request of Primo Koroma PA for evaluation of the above.     He describes a remote history of 2 sleep studies in [place] about 20 years ago.  He says he was told that the sleep apnea was not present at the time.    More recently he has been seen by Dr. Shaffer for paroxysmal atrial flutter.  He has had 2 attempts at ECV and has been on antiarrhythmics.  A history of heavy snoring and apneic episodes were elicited and he was referred here.    His wife describes apneas and loud snoring.  He says he is occasionally sleepy during the day.  He describes 2 or 3 awakenings at night.  He averages 6 or 7 hours of sleep per night and keeps a regular sleep schedule.  Sleep initiation is generally fairly quick but sometimes when he awakens he has trouble going back to sleep depending on what time of the night it is.    [place] Scale is: 3/24    The patient's relevant past medical, surgical, family, and social history reviewed and updated in Epic as appropriate.    Current medications are:   Current Outpatient Medications:   •  apixaban (ELIQUIS) 5 MG tablet tablet, Take 1 tablet by mouth 2 (Two) Times a Day., Disp: 180 tablet, Rfl: 3  •  ascorbic acid (VITAMIN C) 1000 MG tablet, Take 1 tablet by mouth Daily., Disp: , Rfl:   •  atorvastatin (LIPITOR) 10 MG tablet, Take 1 tablet by mouth Daily., Disp: , Rfl:   •  cholecalciferol (VITAMIN D3) 25 MCG (1000 UT) tablet, Take 2 tablets by mouth Daily., Disp: , Rfl:   •  famotidine (PEPCID) 10 MG tablet, Take 4 tablets by mouth Daily., Disp: , Rfl:   •  flecainide (TAMBOCOR) 50 MG tablet, Take 1 tablet by mouth 2 (Two) Times a Day., Disp: 180 tablet, Rfl: 3  •  fluocinonide (LIDEX) 0.05 % cream, Apply 1 application topically to the appropriate area as directed As Needed., Disp: , Rfl:   •  folic acid (FOLVITE) 1 MG tablet, Take  "1 tablet by mouth Daily., Disp: , Rfl:   •  ketoconazole (NIZORAL) 2 % cream, Apply 1 application topically to the appropriate area as directed As Needed for Itching., Disp: , Rfl:   •  lisinopril (PRINIVIL,ZESTRIL) 10 MG tablet, Take 1 tablet by mouth Daily., Disp: , Rfl:   •  metFORMIN (GLUCOPHAGE) 850 MG tablet, Take 850 mg by mouth 2 (Two) Times a Day With Meals. For Triglycerides , Disp: , Rfl:   •  metoprolol succinate XL (TOPROL-XL) 25 MG 24 hr tablet, Take 1 tablet by mouth 2 (Two) Times a Day., Disp: 180 tablet, Rfl: 3  •  Triamcinolone Acetonide (NASACORT) 55 MCG/ACT nasal inhaler, 2 sprays into the nostril(s) as directed by provider Daily., Disp: , Rfl:   •  zolpidem (AMBIEN) 10 MG tablet, Take 1 tablet by mouth At Night As Needed for Sleep., Disp: , Rfl: .    Review of Systems    Review of Systems  ROS documented in patient questionnaire ×14 systems.  Reviewed with patient.  Otherwise negative except as noted in HPI.    Physical Exam    Blood pressure 138/76, pulse 85, height 182.9 cm (72\"), weight 107 kg (235 lb), SpO2 97 %. Body mass index is 31.87 kg/m².    Physical Exam  Vitals and nursing note reviewed.   Constitutional:       Appearance: Normal appearance. He is well-developed.   HENT:      Head: Normocephalic and atraumatic.      Nose: Nose normal.      Mouth/Throat:      Mouth: Mucous membranes are moist.      Pharynx: Oropharynx is clear. No oropharyngeal exudate.      Comments: Class IV airway  Eyes:      General: No scleral icterus.     Conjunctiva/sclera: Conjunctivae normal.   Neck:      Thyroid: No thyromegaly.      Trachea: No tracheal deviation.   Cardiovascular:      Rate and Rhythm: Normal rate and regular rhythm.      Heart sounds: No murmur heard.    No friction rub. No gallop.   Pulmonary:      Effort: Pulmonary effort is normal. No respiratory distress.      Breath sounds: No wheezing or rales.   Musculoskeletal:         General: No deformity. Normal range of motion.   Skin:     " General: Skin is warm and dry.      Findings: No rash.   Neurological:      Mental Status: He is alert and oriented to person, place, and time.   Psychiatric:         Behavior: Behavior normal.         Thought Content: Thought content normal.         DATA:    Reviewed 1/23/2022 note from Dr. Shaffer    Reviewed 12/13/2022 labs including CBC and CMP    ASSESSMENT:    Problem List Items Addressed This Visit    None  Visit Diagnoses     Obstructive sleep apnea, adult    -  Primary    Relevant Orders    Home Sleep Study    Excessive daytime sleepiness        Relevant Orders    Home Sleep Study    Snoring        Relevant Orders    Home Sleep Study          72-year-old male with signs and symptoms of obstructive sleep apnea and paroxysmal atrial fibrillation.  He has snoring, witnessed apneas, frequent nocturnal awakenings, and nonrestorative sleep.  He has an at risk body habitus for ELIEL including an elevated BMI and class IV airway.  He warrants further evaluation for the likely presence of sleep apnea.    PLAN:    1. Home sleep apnea testing is an appropriate initial diagnostic step in his case.  2. I discussed the diagnostic process as well as treatment options for obstructive sleep apnea if that is diagnosed.  3. I went over the long-term cardiovascular and metabolic risks of untreated obstructive sleep apnea.  4. I recommended long-term, healthy weight loss.  5. The patient was amenable to a trial of CPAP therapy if deemed appropriate after testing complete.  6. Close sleep center follow-up.      I have reviewed the results of my evaluation and impression and discussed my recommendations in detail with the patient.    Signed by  Andres Shaver MD    February 1, 2023      CC: Freeman Smith MD Manning, William D, PA

## 2023-02-13 ENCOUNTER — PRE-ADMISSION TESTING (OUTPATIENT)
Dept: PREADMISSION TESTING | Facility: HOSPITAL | Age: 73
End: 2023-02-13
Payer: MEDICARE

## 2023-02-13 DIAGNOSIS — I48.92 PAROXYSMAL ATRIAL FLUTTER: ICD-10-CM

## 2023-02-13 LAB
ANION GAP SERPL CALCULATED.3IONS-SCNC: 10 MMOL/L (ref 5–15)
BUN SERPL-MCNC: 16 MG/DL (ref 8–23)
BUN/CREAT SERPL: 12.7 (ref 7–25)
CALCIUM SPEC-SCNC: 9.5 MG/DL (ref 8.6–10.5)
CHLORIDE SERPL-SCNC: 105 MMOL/L (ref 98–107)
CO2 SERPL-SCNC: 25 MMOL/L (ref 22–29)
CREAT SERPL-MCNC: 1.26 MG/DL (ref 0.76–1.27)
DEPRECATED RDW RBC AUTO: 42.8 FL (ref 37–54)
EGFRCR SERPLBLD CKD-EPI 2021: 61.4 ML/MIN/1.73
ERYTHROCYTE [DISTWIDTH] IN BLOOD BY AUTOMATED COUNT: 12.5 % (ref 12.3–15.4)
GLUCOSE SERPL-MCNC: 101 MG/DL (ref 65–99)
HCT VFR BLD AUTO: 52.7 % (ref 37.5–51)
HGB BLD-MCNC: 17.8 G/DL (ref 13–17.7)
MAGNESIUM SERPL-MCNC: 1.9 MG/DL (ref 1.6–2.4)
MCH RBC QN AUTO: 31.6 PG (ref 26.6–33)
MCHC RBC AUTO-ENTMCNC: 33.8 G/DL (ref 31.5–35.7)
MCV RBC AUTO: 93.4 FL (ref 79–97)
PLATELET # BLD AUTO: 207 10*3/MM3 (ref 140–450)
PMV BLD AUTO: 9.4 FL (ref 6–12)
POTASSIUM SERPL-SCNC: 4.8 MMOL/L (ref 3.5–5.2)
RBC # BLD AUTO: 5.64 10*6/MM3 (ref 4.14–5.8)
SODIUM SERPL-SCNC: 140 MMOL/L (ref 136–145)
WBC NRBC COR # BLD: 11.95 10*3/MM3 (ref 3.4–10.8)

## 2023-02-13 PROCEDURE — 83735 ASSAY OF MAGNESIUM: CPT

## 2023-02-13 PROCEDURE — 85027 COMPLETE CBC AUTOMATED: CPT

## 2023-02-13 PROCEDURE — 36415 COLL VENOUS BLD VENIPUNCTURE: CPT

## 2023-02-13 PROCEDURE — 80048 BASIC METABOLIC PNL TOTAL CA: CPT

## 2023-02-13 NOTE — PAT
"An arrival time for procedure was not provided during PAT visit. If patient had any questions or concerns about their arrival time, they were instructed to contact their surgeon/physician.  Additionally, if the patient referred to an arrival time that was acquired from their my chart account, patient was encouraged to verify that time with their surgeon/physician. Arrival times are NOT provided in Pre Admission Testing Department.    Dear Patient,    Do NOT eat, drink, or smoke after midnight the night before your procedure.   Take your medications as instructed by your doctor.    Glasses and jewelry may be worn, but dentures must be removed prior to your procedure.    Leave any items you consider valuable at home.      MORNING of your Procedure, please bring the following:     -Photo ID and insurance card(s)    -ALL medications in their ORIGINAL CONTAINERS    -Co-pay and/or deductible required by your insurance   -Copy of living will or power of  document (if not brought to Pre-Admission Testing department)   -CPAP mask and tubing, not your machine (if applicable)   -Relaxation aids (music, books, magazines)   -Skin Prep Instruction Sheet (if applicable)       Check in is on the 2nd floor of the [address] building.  Your procedure will be performed in the cath lab or EP lab.  During your procedure, your family will wait in the cath lab waiting area where you checked in.      Need to make arrangements for transportation prior to discharge.    A handout regarding \"Heart Healthy Eating\" was provided today to encourage healthy eating habits.    Educational handout related to procedure was given in Pre-Admission testing.  The educational handout is for informational purposes only.  If you have any questions about your procedure, please speak with your physician.      Please note:  If you are scheduled to have one of the following procedures: Pulmonary Vein Ablation, Lead Extraction, MitraClip, Cerebral Coilings or " Embolization, please let your family know that after your procedure you will be going to recovery unit on the 2nd floor of the [facility] building.  When the physician is finished speaking with your family after your procedure is completed, your family will be directed or escorted to the surgery waiting area in the [facility].  This is where your family will wait until you are given a room assignment and then your family will be directed to the appropriate unit.    Patient denies any current skin issues.     Per Anesthesia Request, patient instructed not to take their ACE/ARB medications on the AM of surgery.

## 2023-02-13 NOTE — DISCHARGE INSTRUCTIONS
"Dear Patient,    Do NOT eat, drink, or smoke after midnight the night before your procedure.   Take your medications as instructed by your doctor.    Glasses and jewelry may be worn, but dentures must be removed prior to your procedure.    Leave any items you consider valuable at home.      MORNING of your Procedure, please bring the following:     -Photo ID and insurance card(s)    -ALL medications in their ORIGINAL CONTAINERS    -Co-pay and/or deductible required by your insurance   -Copy of living will or power of  document (if not brought to Pre-Admission Testing department)   -CPAP mask and tubing, not your machine (if applicable)   -Relaxation aids (music, books, magazines)   -Skin Prep Instruction Sheet (if applicable)       Check in is on the 2nd floor of the [address] [facility].  Your procedure will be performed in the cath lab or EP lab.  During your procedure, your family will wait in the cath lab waiting area where you checked in.      Need to make arrangements for transportation prior to discharge.    A handout regarding \"Heart Healthy Eating\" was provided today to encourage healthy eating habits.    Educational handout related to procedure was given in Pre-Admission testing.  The educational handout is for informational purposes only.  If you have any questions about your procedure, please speak with your physician.      Please note:  If you are scheduled to have one of the following procedures: Pulmonary Vein Ablation, Lead Extraction, MitraClip, Cerebral Coilings or Embolization, please let your family know that after your procedure you will be going to recovery unit on the 2nd floor of the [facility].  When the physician is finished speaking with your family after your procedure is completed, your family will be directed or escorted to the surgery waiting area in the [address] [place] [address].  This is where your family will wait until you are given a room assignment and then your family will be directed " to the appropriate unit.

## 2023-02-22 ENCOUNTER — HOSPITAL ENCOUNTER (OUTPATIENT)
Facility: HOSPITAL | Age: 73
Discharge: HOME | End: 2023-02-22
Attending: INTERNAL MEDICINE | Admitting: INTERNAL MEDICINE
Payer: MEDICARE

## 2023-02-22 VITALS
DIASTOLIC BLOOD PRESSURE: 89 MMHG | RESPIRATION RATE: 7 BRPM | SYSTOLIC BLOOD PRESSURE: 130 MMHG | OXYGEN SATURATION: 95 % | HEART RATE: 86 BPM | BODY MASS INDEX: 32.01 KG/M2 | HEIGHT: 72 IN | WEIGHT: 236.33 LBS | TEMPERATURE: 97.6 F

## 2023-02-22 DIAGNOSIS — I48.92 PAROXYSMAL ATRIAL FLUTTER: ICD-10-CM

## 2023-02-22 PROCEDURE — 93653 COMPRE EP EVAL TX SVT: CPT | Performed by: INTERNAL MEDICINE

## 2023-02-22 PROCEDURE — 25010000002 FENTANYL CITRATE (PF) 50 MCG/ML SOLUTION: Performed by: INTERNAL MEDICINE

## 2023-02-22 PROCEDURE — 99152 MOD SED SAME PHYS/QHP 5/>YRS: CPT | Performed by: INTERNAL MEDICINE

## 2023-02-22 PROCEDURE — C1894 INTRO/SHEATH, NON-LASER: HCPCS | Performed by: INTERNAL MEDICINE

## 2023-02-22 PROCEDURE — 0 LIDOCAINE 1 % SOLUTION: Performed by: INTERNAL MEDICINE

## 2023-02-22 PROCEDURE — C1732 CATH, EP, DIAG/ABL, 3D/VECT: HCPCS | Performed by: INTERNAL MEDICINE

## 2023-02-22 PROCEDURE — 93662 INTRACARDIAC ECG (ICE): CPT | Performed by: INTERNAL MEDICINE

## 2023-02-22 PROCEDURE — 93623 PRGRMD STIMJ&PACG IV RX NFS: CPT | Performed by: INTERNAL MEDICINE

## 2023-02-22 PROCEDURE — 93005 ELECTROCARDIOGRAM TRACING: CPT | Performed by: INTERNAL MEDICINE

## 2023-02-22 PROCEDURE — C1766 INTRO/SHEATH,STRBLE,NON-PEEL: HCPCS | Performed by: INTERNAL MEDICINE

## 2023-02-22 PROCEDURE — S0260 H&P FOR SURGERY: HCPCS | Performed by: INTERNAL MEDICINE

## 2023-02-22 PROCEDURE — 99153 MOD SED SAME PHYS/QHP EA: CPT | Performed by: INTERNAL MEDICINE

## 2023-02-22 PROCEDURE — 25010000002 ONDANSETRON PER 1 MG: Performed by: INTERNAL MEDICINE

## 2023-02-22 PROCEDURE — C1759 CATH, INTRA ECHOCARDIOGRAPHY: HCPCS | Performed by: INTERNAL MEDICINE

## 2023-02-22 PROCEDURE — 25010000002 MIDAZOLAM PER 1 MG: Performed by: INTERNAL MEDICINE

## 2023-02-22 PROCEDURE — 25010000002 ADENOSINE PER 6 MG: Performed by: INTERNAL MEDICINE

## 2023-02-22 PROCEDURE — C1730 CATH, EP, 19 OR FEW ELECT: HCPCS | Performed by: INTERNAL MEDICINE

## 2023-02-22 RX ORDER — METOPROLOL SUCCINATE 25 MG/1
25 TABLET, EXTENDED RELEASE ORAL 2 TIMES DAILY
Status: DISCONTINUED | OUTPATIENT
Start: 2023-02-22 | End: 2023-02-22 | Stop reason: HOSPADM

## 2023-02-22 RX ORDER — ONDANSETRON 2 MG/ML
INJECTION INTRAMUSCULAR; INTRAVENOUS
Status: DISCONTINUED | OUTPATIENT
Start: 2023-02-22 | End: 2023-02-22 | Stop reason: HOSPADM

## 2023-02-22 RX ORDER — BUPIVACAINE HYDROCHLORIDE 5 MG/ML
INJECTION, SOLUTION PERINEURAL
Status: DISCONTINUED | OUTPATIENT
Start: 2023-02-22 | End: 2023-02-22 | Stop reason: HOSPADM

## 2023-02-22 RX ORDER — ONDANSETRON 2 MG/ML
4 INJECTION INTRAMUSCULAR; INTRAVENOUS EVERY 6 HOURS PRN
Status: DISCONTINUED | OUTPATIENT
Start: 2023-02-22 | End: 2023-02-22 | Stop reason: HOSPADM

## 2023-02-22 RX ORDER — ADENOSINE 3 MG/ML
INJECTION, SOLUTION INTRAVENOUS
Status: DISCONTINUED | OUTPATIENT
Start: 2023-02-22 | End: 2023-02-22 | Stop reason: HOSPADM

## 2023-02-22 RX ORDER — FENTANYL CITRATE 50 UG/ML
INJECTION, SOLUTION INTRAMUSCULAR; INTRAVENOUS
Status: DISCONTINUED | OUTPATIENT
Start: 2023-02-22 | End: 2023-02-22 | Stop reason: HOSPADM

## 2023-02-22 RX ORDER — LIDOCAINE HYDROCHLORIDE 10 MG/ML
INJECTION, SOLUTION INFILTRATION; PERINEURAL
Status: DISCONTINUED | OUTPATIENT
Start: 2023-02-22 | End: 2023-02-22 | Stop reason: HOSPADM

## 2023-02-22 RX ORDER — NITROGLYCERIN 0.4 MG/1
0.4 TABLET SUBLINGUAL
Status: DISCONTINUED | OUTPATIENT
Start: 2023-02-22 | End: 2023-02-22 | Stop reason: HOSPADM

## 2023-02-22 RX ORDER — SODIUM CHLORIDE 9 MG/ML
40 INJECTION, SOLUTION INTRAVENOUS AS NEEDED
Status: DISCONTINUED | OUTPATIENT
Start: 2023-02-22 | End: 2023-02-22 | Stop reason: HOSPADM

## 2023-02-22 RX ORDER — ACETAMINOPHEN 325 MG/1
650 TABLET ORAL EVERY 4 HOURS PRN
Status: DISCONTINUED | OUTPATIENT
Start: 2023-02-22 | End: 2023-02-22 | Stop reason: HOSPADM

## 2023-02-22 RX ORDER — MIDAZOLAM HYDROCHLORIDE 1 MG/ML
INJECTION INTRAMUSCULAR; INTRAVENOUS
Status: DISCONTINUED | OUTPATIENT
Start: 2023-02-22 | End: 2023-02-22 | Stop reason: HOSPADM

## 2023-02-22 NOTE — OP NOTE
"       Cardiac Electrophysiology Procedure Note      [facility] at [facility]     CATHETER ABLATION FOR ATRIAL FLUTTER (Typical CTI Dependent)    PROCEDURES PERFORMED:   · Catheter ablation of atrial flutter  · Full diagnostic EP study  · 3D electroanatomic mapping  · drug infusion / programmed pacing    PREPROCEDURAL DIAGNOSES:  ·  Typical Atrial Flutter  · [id number] score of  2    POST PROCEDURE DIANGOSES:  As above.    MODERATE SEDATION FOR PROCEDURE:    Moderate sedation was given during this procedure.    I supervised and directed RN to administer this sedation.  This staff member also monitored the patient's hemodynamic and respiratory status and response to these medications.  Please see the full detailed procedure report generated by the electrophysiology laboratory staff.  The patient tolerated moderate sedation well.  There were no complications regarding sedation.  The total dose of fentanyl was 150 mcg and the total dose of midazolam was 8 mg.  The total dose of Brevital was 60 mg.  First sedation was administered at 1 PM and continued through 1:38 PM.    INDICATION FOR PROCEDURE:  Briefly, Anibal Anderson is a 72 y.o. year old male with a history of typical right atrial flutter in isolation, no history of atrial fibrillation who presents today for elective catheter ablation.    ANTICOAGULATION STRATEGY PRIOR TO AND POST PROCEDURE: Apixaban 5 mg orally twice daily. The last dose of anticoagulant was confirmed to have been taken this morning.      PT/INR:  No results found for: LABPROT, INR  PTT:  No results found for: APTT  CBC: No results found for: WBC, RBC, HGB, HCT, MCV, RDW, PLT  BMP:   No results found for: NA, K, CL, CO2, BUN, CREATININE, LABCREA, EGFR, GLU, LABGLUC    Vital Signs: BP (!) 161/102   Pulse 87   Temp 97.6 °F (36.4 °C) (Oral)   Resp (!) 7   Ht 182.9 cm (72\")   Wt 107 kg (236 lb 5.3 oz)   SpO2 95%   BMI 32.05 kg/m²      Admit Weight:  107 kg (236 lb 5.3 " oz)  BMI: Body mass index is 32.05 kg/m².    PROCEDURE NARRATIVE:  The patient was able to give written informed consent after revisiting the key portions of the risk versus benefit profile of the procedure.  This discussion was framed by our lengthy conversations  (please see our detailed notes).  Patient verbalized strong understanding of this discussion and a strong desire to proceed with the procedure.  Please note that this detailed informed consent process utilized mutual and shared decision making process between all parties involved, principally the physician and patient, but also potentially with input from the patient's selected family and friends.    The patient was brought to the EP laboratory in the post absorptive state. The patient was then prepared and draped in a routing sterile fashion.  Seldinger access was obtained at the right common femoral vein with two venipunctures.  J tip wires were advanced into the vascular space.  Short 9, 8 [place]er sheaths and a single of deflectable sheath were placed into the right common femoral vein and the inferior vena cava / right atrium in an over the wire fashion.    An 8 [place]er decapolar electrophysiology catheter was placed into the coronary sinus for left atrial pacing recording.  A [employer] 4 mm irrigated tip ablation catheter was placed into the right atrium right ventricle AV node his bundle position triangle of Munson  do and used for pacing recording in all of the sites.the   This catheter was later used for catheter ablation along the cavo tricuspid isthmus.      An 8 [place] phased array intracardiac echocardiography probe was placed into the right atrium and right ventricle.  This was used to: a) exclude left atrial appendage thrombus, b) monitor the pericardial space for fluid, c) monitor catheter stability on the cavotricuspid isthmus, and d) ascertain CTI anatomy.    We used the [employer] 3 dimensional electroanatomic mapping system to  reconstruct a rudimentary structure of the right atrium AV node his bundle position   in triangle of Munson.    We confirmed that the mechanism of tachycardia was indeed typical right atrial flutter.  This was performed by careful review of the P-wave morphology on the surface EKG.  This was consistent with typical counter-clockwise right atrial flutter.  Additionally reviewed the electrograms along the cavo tricuspid isthmus and found that these were exactly during atrial mid diastole.    We proceeded with catheter ablation.  Ablation was performed along the cavo tricuspid isthmus beginning at the ventricular aspect in extended to the caval aspect of the cavo tricuspid isthmus.  Catheter ablation was performed with a maximum of 50 w power was performed along the isthmus until all electrograms were eliminated.  The patient's tachycardia terminated during catheter ablation.   Sinus rhythm ensued.    There was no evidence of significant sinus node dysfunction.    We then demonstrated that there was bidirectional block with differential pacing maneuvers.    We then gave intravenous adenosine to test for dormant conduction through the cavo tricuspid isthmus.  The dose was 18 mg.  After the adenosine effect was observed, we demonstrated that there was bidirectional block with differential pacing maneuvers once again.    Please note that at this point we performed a diagnostic EP study.  Please note that it is impossible for a diagnostic EP study during atrial arrhythmias in this case specifically atrial flutter.  This is why the EP study was performed after the ablation.    Data obtained from this is listed in the below table:    Initial Study    Isuprel Washout Study           drive train / burst extrastim    QRS 88    Rhythm          Atrial CL      R-R 1024    Ventricular CL      AH 74    AVBCL      HV 52    AVNERP       drive train / burst extrastim   Slow Pway ERP      Rhythm     Fast Pway ERP      Atrial  "CL     VABCL conc? /  Dec?      Ventricular CL     VAERP      AVBCL 450    AERP      AVNERP 600 420   VERP      Slow Pway ERP     AP antegrade ERP      Fast Pway ERP     AP retrograde ERP      VABCL conc? /  Dec?           VAERP    Final Study      AERP      drive train / burst extrastim    VERP     Rhythm      AP antegrade ERP     Atrial CL      AP retrograde ERP     Ventricular CL           AVBCL     Isuprel Dose =      AVNERP       drive train / burst extrastim   Slow Pway ERP      Rhythm     Fast Pway ERP      Atrial CL     VABCL conc? /  Dec?      Ventricular CL     VAERP      AVBCL     AERP      AVNERP     VERP      Slow Pway ERP     AP antegrade ERP      Fast Pway ERP     AP retrograde ERP      VABCL conc? /  Dec?           VAERP           AERP           VERP           AP antegrade ERP           AP retrograde ERP                         Catheters and sheaths were then removed from the body.    A hemostatic \"figure of eight\" suture was applied to the cutaneous and subcutaneous tissue overlying the vascular puncture site at the groin.  This suture is to be removed prior to the patient being discharged home.    The patient transferred to recovery in stable condition.     COMPLICATIONS: none    EBL: minimal    RADIATION EXPOSURE:5 mGy over 2.1 minutes    KEY PROCEDURAL FINDINGS:    · Normal AV node and His-Purkinje system function  · Successful cath ablation of typical atrial flutter  ·   POST PROCEDURAL PLAN:    ·  The patient will be observed with the usual recovery process  and then I anticipate that  the patient can likely be discharged home later today.  · Uninterrupted anticoagulation for not less than 30 days unless specially instructed otherwise by myself or another member of our EP physician team.  Please note that the patient and the patient’s family have been extensively counseled about this critical requirement and have agreed to comply.  · Medications were reconciled, and key changes in medications " include: none  · The patient will be seen at our office in 3 months.

## 2023-02-22 NOTE — INTERVAL H&P NOTE
H&P reviewed. The patient was examined and there are no changes to the H&P.  Patient presents today for atrial flutter RFA with Dr. Shaffer. He is status post MAIK/ECV in 10/2022 with recurrent Aflutter and repeat ECV 12/2022. Has since had recurrent symptomatic Aflutter since his second ECV.  On flecainide, with last dose this morning.  The risks, benefits, and alternatives of the procedure have been reviewed and the patient wishes to proceed. Further recommendations to follow procedure.     Electronically signed by KELSY Shell, 02/22/23, 11:12 AM EST.

## 2023-02-23 ENCOUNTER — CALL CENTER PROGRAMS (OUTPATIENT)
Dept: CALL CENTER | Facility: HOSPITAL | Age: 73
End: 2023-02-23
Payer: MEDICARE

## 2023-02-23 NOTE — OUTREACH NOTE
PCI/Device Survey    Flowsheet Row Responses   Facility patient discharged from? [facility]   Procedure date 02/22/23   Procedure (if device, specify in description) Ablation   Performing MD Dr. Roc Shaffer   Attempt successful? Yes   Call start time 1200   Call end time 1204   Has the patient had any of the following symptoms since discharge? Leg or arm pain, Shortness of breath, Chest pain, Dizziness or lightheadedness   Nursing interventions Advised to call MD   Symptom comments Says one spot is tender on the opposite side of entry site.   Is the patient taking prescribed medications: --  [Eliquis]   Nursing intervention Reminded to continue to take prescribed medications, Nurse provided patient education   Does the patient have any of the following symptoms related to the cath/surgical site? Bruising, Redness, warmth, or swelling at the site, Drainage (other than a small amount of blood on the dressing), Bleeding that does not stop after 30 minutes of holding steady pressure on the site, Fever, Arm, hand or leg pale, cool, tingly or numb   Nursing intervention Patient education provided   Does the patient have an appointment scheduled with the cardiologist? Yes   Appointment comments Appt is June 5th. Dr Hagen March 6th.   If the patient is a current smoker, are they able to teach back resources for cessation? Not a smoker   Did the patient feel prepared to go home on the same day as the procedure? Yes   Is the patient satisfied with the same day discharge process? Yes   Discharge process comments He was ready to go home.   PCI/Device call completed Yes   Wrap up additional comments He is doing well. Will monitor the site.          Erica HOU - Registered Nurse

## 2023-02-28 LAB
QT INTERVAL: 420 MS
QTC INTERVAL: 493 MS

## 2023-03-03 ENCOUNTER — TELEPHONE (OUTPATIENT)
Dept: CARDIOLOGY | Facility: CLINIC | Age: 73
End: 2023-03-03
Payer: MEDICARE

## 2023-03-03 NOTE — TELEPHONE ENCOUNTER
Patient had a PVA on 2/22/23. He states that since he had the ablation his HR has been staying lower. His HR has been ranging from 39-84. He has not been checking his BP daily. He decreased his dose of Metoprolol XL to 25 mg daily on Monday. He said that this morning his HR was 39 so he is not going to take Metoprolol today. He does have a BP monitor, so I instructed him to check his BP twice a day and continue to monitor his HR. I did let him to know that if he just stops taking the beta blocker, his BP may become elevated and he may experience rebound tachycardia. He would like to know what you would like him to do with his medications?

## 2023-03-06 ENCOUNTER — OFFICE VISIT (OUTPATIENT)
Dept: CARDIOLOGY | Facility: CLINIC | Age: 73
End: 2023-03-06
Payer: MEDICARE

## 2023-03-06 VITALS
BODY MASS INDEX: 31.83 KG/M2 | HEIGHT: 72 IN | WEIGHT: 235 LBS | HEART RATE: 90 BPM | SYSTOLIC BLOOD PRESSURE: 142 MMHG | DIASTOLIC BLOOD PRESSURE: 90 MMHG | OXYGEN SATURATION: 95 %

## 2023-03-06 DIAGNOSIS — I10 HYPERTENSION, ESSENTIAL: ICD-10-CM

## 2023-03-06 DIAGNOSIS — Z79.01 CHRONIC ANTICOAGULATION: ICD-10-CM

## 2023-03-06 DIAGNOSIS — E78.2 MIXED HYPERLIPIDEMIA: ICD-10-CM

## 2023-03-06 DIAGNOSIS — I48.92 PAROXYSMAL ATRIAL FLUTTER: Primary | ICD-10-CM

## 2023-03-06 PROCEDURE — 99214 OFFICE O/P EST MOD 30 MIN: CPT | Performed by: INTERNAL MEDICINE

## 2023-03-06 RX ORDER — LISINOPRIL 20 MG/1
20 TABLET ORAL DAILY
Qty: 90 TABLET | Refills: 3 | Status: SHIPPED | OUTPATIENT
Start: 2023-03-06 | End: 2023-03-09 | Stop reason: SDUPTHER

## 2023-03-06 NOTE — PROGRESS NOTES
OFFICE VISIT  NOTE  [facility] CARDIOLOGY [place] [facility]      Name: Anibal Anderson    Date: 3/6/2023  MRN:  [id number]  :  1950      REFERRING/PRIMARY PROVIDER:  Freeman Smith MD    Chief Complaint   Patient presents with   • Hypertension, essential       HPI: Anibal Anderson is a 72 y.o. male who presents today for paroxysmal atrial flutter.  Diagnosed at PCP 10/18/2022 started on metoprolol and Eliquis.  Status post MAIK/ECV 2022. history of hypertension, hyperlipidemia, CKD has 1 kidney.  Status post typical atrial flutter ablation by Dr. Shaffer 2023.  Lexiscan stress test 2023 low risk, normal.  Doing well after ablation, denies palpitations.  Concerned about low heart rate noted on his Fitbit got down to heart rate of 39 while sitting in his lazy boy last week, called our office, he stopped metoprolol on his own but resumed it every other day after discussing with Dr. Shaffer's nurse.  Denies lightheadedness or dizziness    Past Medical History:   Diagnosis Date   • Abnormal ECG    • Arrhythmia    • Atrial fibrillation (HCC)    • Chronic kidney disease    • Hyperlipidemia    • Hypertension    • Kidney disease     One kidney       Past Surgical History:   Procedure Laterality Date   • CARDIAC ELECTROPHYSIOLOGY PROCEDURE N/A 2023    Procedure: Ablation atrial flutter with Rhythmia. DNS meds.;  Surgeon: Roc Shaffer DO;  Location: [facility] LOCATION;  Service: Cardiovascular;  Laterality: N/A;   • CARDIOVERSION     • CARPAL TUNNEL RELEASE      x2   • CYSTOSCOPY TRANSURETHRAL RESECTION OF PROSTATE     • NEPHRECTOMY Right    • TENDON REPAIR     • WISDOM TOOTH EXTRACTION         Social History     Socioeconomic History   • Marital status:    Tobacco Use   • Smoking status: Never   • Smokeless tobacco: Never   Vaping Use   • Vaping Use: Never used   Substance and Sexual Activity   • Alcohol use: Yes     Comment: Occasionally 1 or 2 beers   • Drug  use: Never   • Sexual activity: Defer     Partners: Female       Family History   Problem Relation Age of Onset   • Heart disease Mother    • Cancer Father    • Thyroid disease Father    • Diabetes Father    • Obesity Brother    • Heart disease Brother         ROS:   Constitutional no fever,  no weight loss   Skin no rash, no subcutaneous nodules   Otolaryngeal no difficulty swallowing   Cardiovascular See HPI   Pulmonary no cough, no sputum production   Gastrointestinal no constipation, no diarrhea   Genitourinary no dysuria, no hematuria   Hematologic no easy bruisability, no abnormal bleeding   Musculoskeletal no muscle pain   Neurologic no dizziness, no falls         Allergies   Allergen Reactions   • Penicillins Other (See Comments)     Childhood allergy.  Unknown reaction         Current Outpatient Medications:   •  apixaban (ELIQUIS) 5 MG tablet tablet, Take 1 tablet by mouth 2 (Two) Times a Day., Disp: 180 tablet, Rfl: 3  •  ascorbic acid (VITAMIN C) 1000 MG tablet, Take 1 tablet by mouth Daily., Disp: , Rfl:   •  atorvastatin (LIPITOR) 10 MG tablet, Take 1 tablet by mouth Every Night., Disp: , Rfl:   •  cholecalciferol (VITAMIN D3) 25 MCG (1000 UT) tablet, Take 2 tablets by mouth Daily., Disp: , Rfl:   •  famotidine (PEPCID) 10 MG tablet, Take 1 tablet by mouth Daily., Disp: , Rfl:   •  flecainide (TAMBOCOR) 50 MG tablet, Take 1 tablet by mouth 2 (Two) Times a Day., Disp: 180 tablet, Rfl: 3  •  fluocinonide (LIDEX) 0.05 % cream, Apply 1 application topically to the appropriate area as directed As Needed., Disp: , Rfl:   •  folic acid (FOLVITE) 1 MG tablet, Take 1 tablet by mouth Daily., Disp: , Rfl:   •  ketoconazole (NIZORAL) 2 % cream, Apply 1 application topically to the appropriate area as directed As Needed for Itching., Disp: , Rfl:   •  lisinopril (PRINIVIL,ZESTRIL) 20 MG tablet, Take 1 tablet by mouth Daily., Disp: 90 tablet, Rfl: 3  •  metFORMIN (GLUCOPHAGE) 850 MG tablet, Take 1 tablet by mouth 2  "(Two) Times a Day With Meals. For Triglycerides, Disp: , Rfl:   •  Triamcinolone Acetonide (NASACORT) 55 MCG/ACT nasal inhaler, 2 sprays into the nostril(s) as directed by provider Daily., Disp: , Rfl:   •  zolpidem (AMBIEN) 10 MG tablet, Take 1 tablet by mouth At Night As Needed for Sleep., Disp: , Rfl:     Vitals:    03/06/23 1438   BP: 142/90   BP Location: Right arm   Patient Position: Sitting   Pulse: 90   SpO2: 95%   Weight: 107 kg (235 lb)   Height: 182.9 cm (72\")     Body mass index is 31.87 kg/m².    PHYSICAL EXAM:    General Appearance:   · well developed  · well nourished  HENT:   · oropharynx moist  · lips not cyanotic  Neck:  · thyroid not enlarged  · supple  Respiratory:  · no respiratory distress  · normal breath sounds  · no rales  Cardiovascular:  · no jugular venous distention  · regular rhythm  · apical impulse normal  · S1 normal, S2 normal  · no S3, no S4   · no murmur  · no rub, no thrill  · carotid pulses normal; no bruit  · lower extremity edema: none      Musculoskeletal:  · no clubbing of fingers.   · normocephalic, head atraumatic  Skin:   · warm, dry  Psychiatric:  · judgement and insight appropriate  · normal mood and affect    RESULTS:   Procedures    Results for orders placed during the hospital encounter of 11/02/22    Adult Transesophageal Echo (MAIK) W/ Cont if Necessary Per Protocol    Interpretation Summary  •  Left ventricular ejection fraction appears to be 56 - 60%.  •  Mild left atrial enlargement  •  Normal left atrial appendage with no evidence of thrombus.    I supervised and directed an independent trained observer with the assistance of monitoring the patient's level of consciousness and physiological status throughout the procedure.  Intraoperative service time of 28 minutes        Labs:  Lab Results   Component Value Date    CHOL 137 12/13/2022    TRIG 165 (H) 12/13/2022    HDL 35 (L) 12/13/2022    LDL 74 12/13/2022    AST 20 12/13/2022    ALT 19 12/13/2022     No " results found for: HGBA1C  No components found for: CREATINININE  No results found for: EGFRIFNONA    Most recent PCP note, imaging tests, and labs reviewed.    ASSESSMENT:  Problem List Items Addressed This Visit        Cardiac and Vasculature    Paroxysmal atrial flutter (HCC) - Primary    Overview     · MAIK with ECV, 11/2/2022: Left ventricular ejection fraction appears to be 56 - 60%. Mild left atrial enlargement  · ECV to sinus rhythm, 12/13/2022  · MPS, 1/5/2023:  Myocardial perfusion imaging indicates a normal myocardial perfusion study with no evidence of ischemia. Left ventricular ejection fraction is normal.         Hypertension, essential    Relevant Medications    lisinopril (PRINIVIL,ZESTRIL) 20 MG tablet    Mixed hyperlipidemia       Coag and Thromboembolic    Chronic anticoagulation       PLAN:    1.  Typical atrial flutter:  Status post ablation 2/22/2023 with Dr. Shaffer  Continue flecainide we will defer stopping it to Dr. Shaffer    Taper off metoprolol due to bradycardia  Continue Eliquis until 4/1/2023 per Dr. Shaffer recommendations.    2.  Hypertension:  Long-term goal blood pressure less than 130/80  Increase lisinopril to 20 mg daily since he is stopping metoprolol    3.  Hyperlipidemia:  Goal LDL less than 100  Increase aerobic exercise and low saturated fat diet recommended    4.  CKD stage II:  Has 1 kidney, monitor renal function closely with addition of flecainide    Advance Care Planning   ACP discussion was held with the patient during this visit. Patient does not have an advance directive, information provided.         Return to clinic in 9 months, or sooner as needed.    Thank you for the opportunity to share in the care of your patient; please do not hesitate to call me with any questions.     Louis Hagen MD, [facility]  Office: [phone]  [facility] [place] [address]    03/06/23

## 2023-03-09 RX ORDER — LISINOPRIL 20 MG/1
20 TABLET ORAL DAILY
Qty: 90 TABLET | Refills: 3 | Status: SHIPPED | OUTPATIENT
Start: 2023-03-09

## 2023-03-16 ENCOUNTER — HOSPITAL ENCOUNTER (OUTPATIENT)
Dept: SLEEP MEDICINE | Facility: HOSPITAL | Age: 73
Discharge: HOME | End: 2023-03-16
Admitting: INTERNAL MEDICINE
Payer: MEDICARE

## 2023-03-16 VITALS — WEIGHT: 235 LBS | BODY MASS INDEX: 31.83 KG/M2 | HEIGHT: 72 IN

## 2023-03-16 DIAGNOSIS — R06.83 SNORING: ICD-10-CM

## 2023-03-16 DIAGNOSIS — G47.33 OBSTRUCTIVE SLEEP APNEA, ADULT: ICD-10-CM

## 2023-03-16 DIAGNOSIS — G47.19 EXCESSIVE DAYTIME SLEEPINESS: ICD-10-CM

## 2023-03-16 PROCEDURE — 95800 SLP STDY UNATTENDED: CPT

## 2023-03-21 DIAGNOSIS — G47.19 EXCESSIVE DAYTIME SLEEPINESS: ICD-10-CM

## 2023-03-21 DIAGNOSIS — R06.83 SNORING: ICD-10-CM

## 2023-03-21 DIAGNOSIS — G47.33 OBSTRUCTIVE SLEEP APNEA, ADULT: Primary | ICD-10-CM

## 2023-03-21 PROCEDURE — 95800 SLP STDY UNATTENDED: CPT | Performed by: INTERNAL MEDICINE

## 2023-03-22 ENCOUNTER — TELEPHONE (OUTPATIENT)
Dept: SLEEP MEDICINE | Facility: HOSPITAL | Age: 73
End: 2023-03-22
Payer: MEDICARE

## 2023-03-22 NOTE — TELEPHONE ENCOUNTER
Advised patient that Dr. Shaver ordered an in lab study for further work up. Patient was agreeable and order routed to sleep lab

## 2023-04-12 ENCOUNTER — CLINICAL SUPPORT (OUTPATIENT)
Dept: CARDIOLOGY | Facility: CLINIC | Age: 73
End: 2023-04-12
Payer: MEDICARE

## 2023-04-12 ENCOUNTER — TELEPHONE (OUTPATIENT)
Dept: CARDIOLOGY | Facility: CLINIC | Age: 73
End: 2023-04-12
Payer: MEDICARE

## 2023-04-12 DIAGNOSIS — I48.92 PAROXYSMAL ATRIAL FLUTTER: Primary | ICD-10-CM

## 2023-04-12 NOTE — PROGRESS NOTES
ECG 12 Lead    Date/Time: 4/12/2023 12:08 PM  Performed by: Gabino Mar MD  Authorized by: Gabino Mar MD   Comparison: compared with previous ECG from 2/22/2023  Comparison to previous ECG: Flip to flutter again  Rhythm: atrial flutter  Rate: normal  BPM: 96  QRS axis: normal    Clinical impression: abnormal EKG

## 2023-04-12 NOTE — TELEPHONE ENCOUNTER
Patient had a PVA on 2/22/23. He has been working outside for the past 3 days and noticed that his HR has been elevated. His HR has been ranging between 100-110 at rest and feels irregular. Last night his BP was 130/78 and today it is 142/77. He is going to  Cardiology in [place] today to have an EKG done today. EKG order placed.

## 2023-04-14 NOTE — TELEPHONE ENCOUNTER
I spoke with the patient. He would like to monitor his HR over the weekend and have a repeat EKG on Monday. He wants to discuss having an ablation with his wife.He said that he had worked outside in the sun shoveling dirt for 3 days when he went out of rhythm. He feels like he is back in rhythm today. He stopped taking Eliquis on 4/1/23 per your recommendation. Do you want him to restart it?

## 2023-04-14 NOTE — TELEPHONE ENCOUNTER
Dr. Shaffer reviewed the EKG. He would like the patient to have a repeat ablation since he is back in atrial flutter.

## 2023-04-17 ENCOUNTER — CLINICAL SUPPORT (OUTPATIENT)
Dept: CARDIOLOGY | Facility: CLINIC | Age: 73
End: 2023-04-17
Payer: MEDICARE

## 2023-04-17 DIAGNOSIS — I48.92 PAROXYSMAL ATRIAL FLUTTER: Primary | ICD-10-CM

## 2023-04-17 NOTE — PROGRESS NOTES
ECG 12 Lead    Date/Time: 4/17/2023 11:01 AM  Performed by: Gabino Mar MD  Authorized by: Gabino Mar MD   Comparison: compared with previous ECG from 4/12/2023  Comparison to previous ECG: Went back to sinus from atri;l flutter  Rhythm: sinus rhythm  Rate: normal  BPM: 66  Conduction: non-specific intraventricular conduction delay  QRS axis: normal    Clinical impression: normal ECG

## 2023-05-02 ENCOUNTER — CLINICAL SUPPORT (OUTPATIENT)
Dept: CARDIOLOGY | Facility: CLINIC | Age: 73
End: 2023-05-02
Payer: MEDICARE

## 2023-05-02 DIAGNOSIS — I48.92 PAROXYSMAL ATRIAL FLUTTER: Primary | ICD-10-CM

## 2023-05-02 NOTE — PROGRESS NOTES
ECG 12 Lead    Date/Time: 5/2/2023 12:47 PM  Performed by: Gabino Mar MD  Authorized by: Gabino Mar MD   Comparison: compared with previous ECG from 4/17/2023  Similar to previous ECG  Rhythm: sinus rhythm  Rate: normal  BPM: 96  Conduction: right bundle branch block  QRS axis: left    Clinical impression: abnormal EKG

## 2023-06-05 ENCOUNTER — OFFICE VISIT (OUTPATIENT)
Dept: CARDIOLOGY | Facility: CLINIC | Age: 73
End: 2023-06-05
Payer: MEDICARE

## 2023-06-05 VITALS
SYSTOLIC BLOOD PRESSURE: 130 MMHG | WEIGHT: 263 LBS | OXYGEN SATURATION: 96 % | HEART RATE: 65 BPM | BODY MASS INDEX: 35.62 KG/M2 | HEIGHT: 72 IN | DIASTOLIC BLOOD PRESSURE: 64 MMHG

## 2023-06-05 DIAGNOSIS — Z79.899 LONG TERM CURRENT USE OF ANTIARRHYTHMIC DRUG: ICD-10-CM

## 2023-06-05 DIAGNOSIS — I10 HYPERTENSION, ESSENTIAL: ICD-10-CM

## 2023-06-05 DIAGNOSIS — I48.92 PAROXYSMAL ATRIAL FLUTTER: Primary | ICD-10-CM

## 2023-06-05 DIAGNOSIS — Z79.01 CHRONIC ANTICOAGULATION: ICD-10-CM

## 2023-06-05 NOTE — PROGRESS NOTES
Encounter Date:06/05/2023      Patient ID: Anibal Anderson is a 73 y.o. male.    Freeman Smith MD    Chief Complaint: 3 MO HFU and Atrial Flutter      PROBLEM LIST:  Patient Active Problem List    Diagnosis Date Noted    Paroxysmal atrial flutter 10/24/2022     Priority: High     Note Last Updated: 6/5/2023     MAIK with ECV, 11/2/2022: Left ventricular ejection fraction appears to be 56 - 60%. Mild left atrial enlargement  ECV to sinus rhythm, 12/13/2022  MPS, 1/5/2023:  Myocardial perfusion imaging indicates a normal myocardial perfusion study with no evidence of ischemia. Left ventricular ejection fraction is normal.  Catheter ablation of typical CTI dependent atrial flutter, 2/22/2023      Long term current use of antiarrhythmic drug 06/05/2023     Priority: Low    Chronic anticoagulation 01/23/2023     Priority: Low    Hypertension, essential 10/24/2022     Priority: Low    Mixed hyperlipidemia 10/24/2022    Age-related cataract of both eyes 10/19/2022               History of Present Illness  Patient presents today for follow-up with a history of atrial flutter status post ablation of typical CTI flutter February 22, 2023.  He had a recurrent episode of atrial flutter on April 12, 2023.  He was put back on flecainide 50 mg twice daily and Eliquis 5 mg twice daily.  Since then he has had no awareness of recurrent atrial flutter however, tracks his heart rates on the treadmill and his watch.  His rates have reached as high as 120 bpm while ambulating on the treadmill.  He is compliant with Eliquis reports no bleeding.  Denies dizziness or syncope.  Blood pressure is well managed on his current medical regimen    Allergies   Allergen Reactions    Penicillins Other (See Comments)     Childhood allergy.  Unknown reaction       Current Outpatient Medications   Medication Instructions    apixaban (ELIQUIS) 5 mg, Oral, 2 Times Daily    ascorbic acid (VITAMIN C) 1,000 mg, Oral, Daily    atorvastatin  "(LIPITOR) 10 mg, Oral, Nightly    cholecalciferol (VITAMIN D3) 2,000 Units, Oral, Daily    famotidine (PEPCID) 10 mg, Oral, Daily    flecainide (TAMBOCOR) 50 mg, Oral, 2 Times Daily    fluocinonide (LIDEX) 0.05 % cream 1 application, Topical, As Needed    folic acid (FOLVITE) 1 mg, Oral, Daily    ketoconazole (NIZORAL) 2 % cream 1 application, Topical, As Needed    lisinopril (PRINIVIL,ZESTRIL) 20 mg, Oral, Daily    metFORMIN (GLUCOPHAGE) 850 mg, Oral, 2 Times Daily With Meals, For Triglycerides     Triamcinolone Acetonide (NASACORT) 55 MCG/ACT nasal inhaler 2 sprays, Nasal, Daily    zolpidem (AMBIEN) 10 mg, Oral, Nightly PRN       .    Objective:     /64 (BP Location: Left arm, Patient Position: Sitting, Cuff Size: Adult)   Pulse 65   Ht 182.9 cm (72\")   Wt 119 kg (263 lb)   SpO2 96%   BMI 35.67 kg/m²    Body mass index is 35.67 kg/m².     Vitals reviewed.   Constitutional:       Appearance: Well-developed.   Pulmonary:      Effort: Pulmonary effort is normal. No respiratory distress.      Breath sounds: Normal breath sounds. No wheezing. No rales.      Comments: Bases clear  Chest:      Chest wall: Not tender to palpatation.   Cardiovascular:      Normal rate. Regular rhythm.      Murmurs: There is no murmur.      No gallop.  No click. No rub.   Pulses:     Intact distal pulses.   Edema:     Peripheral edema absent.   Musculoskeletal: Normal range of motion.     Lab Review:                 TSH          12/13/2022    10:24   TSH   TSH 2.160                ECG 12 Lead    Date/Time: 6/5/2023 10:02 AM  Performed by: Primo Koroma PA  Authorized by: Roc Shaffer DO   Comparison: compared with previous ECG from 5/2/2023  Similar to previous ECG  Rhythm: sinus rhythm                   Assessment:      Diagnosis Plan   1. Paroxysmal atrial flutter  Dr. Shaffer discussed redo flutter ablation with patient and his wife.  They wish to proceed.      2. Hypertension, essential  Well managed, continue current " medical regimen      3. Chronic anticoagulation  Tolerating anticoagulation stay on Eliquis 5 mg twice daily until after redo flutter ablation      4. Long term current use of antiarrhythmic drug  Stable EKG continue antiarrhythmic until after flutter ablation        Plan:     Stable cardiac status.  Continue current medications.     Thank you for allowing us to participate in the care of your patient.     Electronically signed by JUDE Epstein, 06/05/23, 10:07 AM EDT.

## 2023-06-05 NOTE — LETTER
June 5, 2023       No Recipients    Patient: Anibal Anderson   YOB: 1950   Date of Visit: 6/5/2023       Dear Dr. Wilson Recipients:    Thank you for referring Anibal Anderson to me for evaluation. Below are the relevant portions of my assessment and plan of care.    If you have questions, please do not hesitate to call me. I look forward to following Anibal along with you.         Sincerely,        Roc Shaffer, DO        CC:   No Recipients    Primo Koroma PA  06/05/23 1007  Sign when Signing Visit          Encounter Date:06/05/2023      Patient ID: Anibal Anderson is a 73 y.o. male.    Freeman Smith MD    Chief Complaint: 3 MO HFU and Atrial Flutter      PROBLEM LIST:  Patient Active Problem List    Diagnosis Date Noted   • Paroxysmal atrial flutter 10/24/2022     Priority: High     Note Last Updated: 6/5/2023     MAIK with ECV, 11/2/2022: Left ventricular ejection fraction appears to be 56 - 60%. Mild left atrial enlargement  ECV to sinus rhythm, 12/13/2022  MPS, 1/5/2023:  Myocardial perfusion imaging indicates a normal myocardial perfusion study with no evidence of ischemia. Left ventricular ejection fraction is normal.  Catheter ablation of typical CTI dependent atrial flutter, 2/22/2023     • Long term current use of antiarrhythmic drug 06/05/2023     Priority: Low   • Chronic anticoagulation 01/23/2023     Priority: Low   • Hypertension, essential 10/24/2022     Priority: Low   • Mixed hyperlipidemia 10/24/2022   • Age-related cataract of both eyes 10/19/2022               History of Present Illness  Patient presents today for follow-up with a history of atrial flutter status post ablation of typical CTI flutter February 22, 2023.  He had a recurrent episode of atrial flutter on April 12, 2023.  He was put back on flecainide 50 mg twice daily and Eliquis 5 mg twice daily.  Since then he has had no awareness of recurrent atrial flutter however, tracks his heart rates on the treadmill  "and his watch.  His rates have reached as high as 120 bpm while ambulating on the treadmill.  He is compliant with Eliquis reports no bleeding.  Denies dizziness or syncope.  Blood pressure is well managed on his current medical regimen    Allergies   Allergen Reactions   • Penicillins Other (See Comments)     Childhood allergy.  Unknown reaction       Current Outpatient Medications   Medication Instructions   • apixaban (ELIQUIS) 5 mg, Oral, 2 Times Daily   • ascorbic acid (VITAMIN C) 1,000 mg, Oral, Daily   • atorvastatin (LIPITOR) 10 mg, Oral, Nightly   • cholecalciferol (VITAMIN D3) 2,000 Units, Oral, Daily   • famotidine (PEPCID) 10 mg, Oral, Daily   • flecainide (TAMBOCOR) 50 mg, Oral, 2 Times Daily   • fluocinonide (LIDEX) 0.05 % cream 1 application, Topical, As Needed   • folic acid (FOLVITE) 1 mg, Oral, Daily   • ketoconazole (NIZORAL) 2 % cream 1 application, Topical, As Needed   • lisinopril (PRINIVIL,ZESTRIL) 20 mg, Oral, Daily   • metFORMIN (GLUCOPHAGE) 850 mg, Oral, 2 Times Daily With Meals, For Triglycerides    • Triamcinolone Acetonide (NASACORT) 55 MCG/ACT nasal inhaler 2 sprays, Nasal, Daily   • zolpidem (AMBIEN) 10 mg, Oral, Nightly PRN       .    Objective:     /64 (BP Location: Left arm, Patient Position: Sitting, Cuff Size: Adult)   Pulse 65   Ht 182.9 cm (72\")   Wt 119 kg (263 lb)   SpO2 96%   BMI 35.67 kg/m²    Body mass index is 35.67 kg/m².     Vitals reviewed.   Constitutional:       Appearance: Well-developed.   Pulmonary:      Effort: Pulmonary effort is normal. No respiratory distress.      Breath sounds: Normal breath sounds. No wheezing. No rales.      Comments: Bases clear  Chest:      Chest wall: Not tender to palpatation.   Cardiovascular:      Normal rate. Regular rhythm.      Murmurs: There is no murmur.      No gallop.  No click. No rub.   Pulses:     Intact distal pulses.   Edema:     Peripheral edema absent.   Musculoskeletal: Normal range of motion.     Lab Review: "                 TSH          12/13/2022    10:24   TSH   TSH 2.160                ECG 12 Lead    Date/Time: 6/5/2023 10:02 AM  Performed by: Primo Koroma PA  Authorized by: Roc Shaffer DO   Comparison: compared with previous ECG from 5/2/2023  Similar to previous ECG  Rhythm: sinus rhythm                   Assessment:      Diagnosis Plan   1. Paroxysmal atrial flutter  Dr. Shaffer discussed redo flutter ablation with patient and his wife.  They wish to proceed.      2. Hypertension, essential  Well managed, continue current medical regimen      3. Chronic anticoagulation  Tolerating anticoagulation stay on Eliquis 5 mg twice daily until after redo flutter ablation      4. Long term current use of antiarrhythmic drug  Stable EKG continue antiarrhythmic until after flutter ablation        Plan:     Stable cardiac status.  Continue current medications.     Thank you for allowing us to participate in the care of your patient.     Electronically signed by JUDE Epstein, 06/05/23, 10:07 AM EDT.

## 2023-06-14 ENCOUNTER — TELEPHONE (OUTPATIENT)
Dept: CARDIOLOGY | Facility: CLINIC | Age: 73
End: 2023-06-14
Payer: MEDICARE

## 2023-06-14 NOTE — TELEPHONE ENCOUNTER
Patient called about scheduling his ablation that was ordered at last clinic visit with Dr. Shaffer.     Patient notified and aware that someone would contact him to schedule.

## 2023-06-19 ENCOUNTER — PREP FOR SURGERY (OUTPATIENT)
Dept: OTHER | Facility: HOSPITAL | Age: 73
End: 2023-06-19
Payer: MEDICARE

## 2023-06-19 DIAGNOSIS — I48.92 PAROXYSMAL ATRIAL FLUTTER: Primary | ICD-10-CM

## 2023-06-19 RX ORDER — ACETAMINOPHEN 325 MG/1
650 TABLET ORAL EVERY 4 HOURS PRN
OUTPATIENT
Start: 2023-06-19

## 2023-06-19 RX ORDER — SODIUM CHLORIDE 9 MG/ML
40 INJECTION, SOLUTION INTRAVENOUS AS NEEDED
OUTPATIENT
Start: 2023-06-19

## 2023-06-19 RX ORDER — NITROGLYCERIN 0.4 MG/1
0.4 TABLET SUBLINGUAL
OUTPATIENT
Start: 2023-06-19

## 2023-06-19 RX ORDER — ONDANSETRON 2 MG/ML
4 INJECTION INTRAMUSCULAR; INTRAVENOUS EVERY 6 HOURS PRN
OUTPATIENT
Start: 2023-06-19

## 2023-06-23 PROBLEM — G47.33 OSA (OBSTRUCTIVE SLEEP APNEA): Status: ACTIVE | Noted: 2023-06-22

## 2023-08-15 ENCOUNTER — OFFICE VISIT (OUTPATIENT)
Dept: SLEEP MEDICINE | Facility: HOSPITAL | Age: 73
End: 2023-08-15
Payer: MEDICARE

## 2023-08-15 VITALS
WEIGHT: 239 LBS | SYSTOLIC BLOOD PRESSURE: 135 MMHG | DIASTOLIC BLOOD PRESSURE: 89 MMHG | BODY MASS INDEX: 32.37 KG/M2 | HEIGHT: 72 IN | OXYGEN SATURATION: 95 % | HEART RATE: 74 BPM

## 2023-08-15 DIAGNOSIS — G47.33 OSA (OBSTRUCTIVE SLEEP APNEA): Primary | ICD-10-CM

## 2023-08-15 NOTE — PROGRESS NOTES
Chief Complaint:   Chief Complaint   Patient presents with    Follow-up       HPI:    Anibal Anderson is a 73 y.o. male here for follow-up of apnea.    73-year-old male with past medical history of paroxysmal atrial flutter on anticoagulation and followed by cardiology.  Patient has had multiple sleep studies in the past in [place].  He was recently seen with complains of snoring and witnessed apneas.  Underwent home-based testing where his AHI was found to be 4.4.  Due to concern for significant sleep disordered breathing patient was recommended to undergo in lab polysomnogram.  He did that study if few 2223 was diagnosed with moderate obstructive sleep apnea and did initiate CPAP therapy.  tPatient states that the day he had his sleep study he went back into a flutter and he is questioning if the sleep study was adequate or if it would give different information if he was not in abnormal rhythm a abnormal rhythm caused a lot of sleep fragmentation for him.  Discussed that in lab study is generally more accurate in this kind of scenario as we can define based on EKG criteria when he is sleeping and when he is not.  He verbalized understanding.     Patient is sleeping 6 to 7 hours nightly and does feel rested upon awakening.  Patient states it varies on how long he goes him to sleep at 80 does not get up during the night.  Patient has an Morganza score of 2/24.  Patient has no concerns or complaints we will continue therapy.    Current medications are:   Current Outpatient Medications:     ascorbic acid (VITAMIN C) 1000 MG tablet, Take 1 tablet by mouth Daily., Disp: , Rfl:     atorvastatin (LIPITOR) 10 MG tablet, Take 1 tablet by mouth Every Night., Disp: , Rfl:     cholecalciferol (VITAMIN D3) 25 MCG (1000 UT) tablet, Take 2 tablets by mouth Daily., Disp: , Rfl:     famotidine (PEPCID) 10 MG tablet, Take 1 tablet by mouth Daily., Disp: , Rfl:     fluocinonide (LIDEX) 0.05 % cream, Apply 1 application  topically to  the appropriate area as directed As Needed., Disp: , Rfl:     folic acid (FOLVITE) 1 MG tablet, Take 1 tablet by mouth Daily., Disp: , Rfl:     ketoconazole (NIZORAL) 2 % cream, Apply 1 application  topically to the appropriate area as directed As Needed for Itching., Disp: , Rfl:     lisinopril (PRINIVIL,ZESTRIL) 20 MG tablet, Take 1 tablet by mouth Daily., Disp: 90 tablet, Rfl: 3    metFORMIN (GLUCOPHAGE) 850 MG tablet, Take 1 tablet by mouth 2 (Two) Times a Day With Meals. For Triglycerides, Disp: , Rfl:     Triamcinolone Acetonide (NASACORT) 55 MCG/ACT nasal inhaler, 2 sprays into the nostril(s) as directed by provider Daily., Disp: , Rfl:     zolpidem (AMBIEN) 10 MG tablet, Take 1 tablet by mouth At Night As Needed for Sleep., Disp: , Rfl: .      The patient's relevant past medical, surgical, family and social history were reviewed and updated in Epic as appropriate.       Review of Systems   Eyes:  Positive for visual disturbance.   Respiratory:  Positive for apnea.    Cardiovascular:  Positive for palpitations.   Psychiatric/Behavioral:  Positive for sleep disturbance.    All other systems reviewed and are negative.      Objective:    Physical Exam  Constitutional:       Appearance: Normal appearance.   HENT:      Head: Normocephalic and atraumatic.      Mouth/Throat:      Mouth: Mucous membranes are moist.      Pharynx: Oropharynx is clear.      Comments: Class 4 airway  Neurological:      Mental Status: He is alert and oriented to person, place, and time.   Psychiatric:         Mood and Affect: Mood normal.         Behavior: Behavior normal.         Thought Content: Thought content normal.         Judgment: Judgment normal.       CPAP Report    46/47 days of use  Greater than 4-hour use 90%  95th percentile pressure 12.3  AHI 1.9  The patient continues to use and benefit from CPAP therapy.    ASSESSMENT/PLAN    Diagnoses and all orders for this visit:    1. ELIEL (obstructive sleep apnea) (Primary)  -     PAP  Therapy        Counseled patient regarding multimodal approach with healthy nutrition, healthy sleep, regular physical activity, social activities, counseling, and medications. Encouraged to practice lateral sleep position. Avoid alcohol and sedatives close to bedtime.    Refill supplies x1 year.  Return to clinic 1 year sooner symptoms warrant     I have reviewed the results of my evaluation and impression and discussed my recommendations in detail with the patient.      Signed by  KELSY Lua    August 15, 2023      CC: Freeman Smith MD         No ref. provider found

## 2023-10-16 ENCOUNTER — OFFICE VISIT (OUTPATIENT)
Dept: CARDIOLOGY | Facility: CLINIC | Age: 73
End: 2023-10-16
Payer: MEDICARE

## 2023-10-16 VITALS
HEIGHT: 72 IN | SYSTOLIC BLOOD PRESSURE: 130 MMHG | WEIGHT: 238 LBS | DIASTOLIC BLOOD PRESSURE: 70 MMHG | OXYGEN SATURATION: 97 % | BODY MASS INDEX: 32.23 KG/M2 | HEART RATE: 73 BPM

## 2023-10-16 DIAGNOSIS — I48.92 PAROXYSMAL ATRIAL FLUTTER: Primary | ICD-10-CM

## 2023-10-16 PROCEDURE — 3075F SYST BP GE 130 - 139MM HG: CPT | Performed by: INTERNAL MEDICINE

## 2023-10-16 PROCEDURE — 3078F DIAST BP <80 MM HG: CPT | Performed by: INTERNAL MEDICINE

## 2023-10-16 PROCEDURE — 99214 OFFICE O/P EST MOD 30 MIN: CPT | Performed by: INTERNAL MEDICINE

## 2023-10-16 RX ORDER — TEMAZEPAM 15 MG/1
CAPSULE ORAL
COMMUNITY
Start: 2023-10-09

## 2023-10-16 RX ORDER — AMLODIPINE BESYLATE 2.5 MG/1
2.5 TABLET ORAL DAILY
COMMUNITY
Start: 2023-09-18

## 2023-10-16 NOTE — PROGRESS NOTES
"                   Cardiac Electrophysiology Outpatient Follow Up Note            [facility]    Follow Up Office Visit      Anibal Anderson  [id number]  10/16/2023  [unfilled]  [unfilled]    Primary Care Physician: Freeman Smith MD    Referred By: No ref. provider found    Subjective     Chief Complaint:   Diagnoses and all orders for this visit:    1. Paroxysmal atrial flutter (Primary)      Chief Complaint   Patient presents with    Paroxysmal atrial flutter       History of Present Illness:   Anibal Anderson is a 73 y.o. male who presents to my electrophysiology clinic for follow up of above complaints.  Anibal is doing quite well.  Had COVID at the end of the summer.  Recovered nicely however.  No palpitations of significance.  Has a Fitbit with an EKG function and where he can record his own EKG..      Past Medical History:   Past Medical History:   Diagnosis Date    Abnormal ECG     Arrhythmia     Arthritis     At risk for sleep apnea     sleep study today - tbd    Atrial fibrillation     Chronic kidney disease     one kidney - pcp watches it    CPAP (continuous positive airway pressure) dependence     Diabetes mellitus     \"prediabetic\"- doesnt check sugar    GERD (gastroesophageal reflux disease)     Headache     h/o    Hyperlipidemia     Hypertension     Kidney disease     One kidney    ELIEL (obstructive sleep apnea) 06/22/2023    Scab     right thigh-   scabbed over- froze area by pcp    SOBOE (shortness of breath on exertion)     Tinnitus     Wears glasses        Past Surgical History:   Past Surgical History:   Procedure Laterality Date    ABLATION OF DYSRHYTHMIC FOCUS  2/22/23    CARDIAC ELECTROPHYSIOLOGY PROCEDURE N/A 02/22/2023    Procedure: Ablation atrial flutter with Rhythmia. DNS meds.;  Surgeon: Roc Shaffer DO;  Location: [facility] INVASIVE LOC[facility];  Service: Cardiovascular;  Laterality: N/A;    CARDIAC ELECTROPHYSIOLOGY PROCEDURE N/A 6/30/2023 "    Procedure: Ablation atrial flutter. Hold Flecainide x3 days prior. DNS Eliquis;  Surgeon: Roc Shaffer DO;  Location: [facility] INVASIVE LOCATION;  Service: Cardiovascular;  Laterality: N/A;    CARDIOVERSION      CARPAL TUNNEL RELEASE Bilateral     x2    COLONOSCOPY      CYSTOSCOPY TRANSURETHRAL RESECTION OF PROSTATE      ENDOSCOPY      NEPHRECTOMY Right     TENDON REPAIR Right     quad    WISDOM TOOTH EXTRACTION         Family History:   Family History   Problem Relation Age of Onset    Heart disease Mother     Cancer Father     Thyroid disease Father     Diabetes Father     Obesity Brother     Heart disease Brother     Heart disease Brother        Social History:   Social History     Socioeconomic History    Marital status:    Tobacco Use    Smoking status: Never     Passive exposure: Past    Smokeless tobacco: Never   Vaping Use    Vaping Use: Never used   Substance and Sexual Activity    Alcohol use: Yes     Comment: Occasionally 1 or 2 beers    Drug use: Never    Sexual activity: Not Currently     Partners: Female       Medications:     Current Outpatient Medications:     amLODIPine (NORVASC) 2.5 MG tablet, Take 1 tablet by mouth Daily., Disp: , Rfl:     ascorbic acid (VITAMIN C) 1000 MG tablet, Take 1 tablet by mouth Daily., Disp: , Rfl:     atorvastatin (LIPITOR) 10 MG tablet, Take 1 tablet by mouth Every Night., Disp: , Rfl:     cholecalciferol (VITAMIN D3) 25 MCG (1000 UT) tablet, Take 2 tablets by mouth Daily., Disp: , Rfl:     famotidine (PEPCID) 10 MG tablet, Take 1 tablet by mouth Daily., Disp: , Rfl:     fluocinonide (LIDEX) 0.05 % cream, Apply 1 application  topically to the appropriate area as directed As Needed., Disp: , Rfl:     folic acid (FOLVITE) 1 MG tablet, Take 1 tablet by mouth Daily., Disp: , Rfl:     ketoconazole (NIZORAL) 2 % cream, Apply 1 application  topically to the appropriate area as directed As Needed for Itching., Disp: , Rfl:     lisinopril (PRINIVIL,ZESTRIL) 20 MG  "tablet, Take 1 tablet by mouth Daily., Disp: 90 tablet, Rfl: 3    metFORMIN (GLUCOPHAGE) 850 MG tablet, Take 1 tablet by mouth 2 (Two) Times a Day With Meals. For Triglycerides, Disp: , Rfl:     temazepam (RESTORIL) 15 MG capsule, , Disp: , Rfl:     Triamcinolone Acetonide (NASACORT) 55 MCG/ACT nasal inhaler, 2 sprays into the nostril(s) as directed by provider Daily., Disp: , Rfl:     zolpidem (AMBIEN) 10 MG tablet, Take 1 tablet by mouth At Night As Needed for Sleep., Disp: , Rfl:     Allergies:   Allergies   Allergen Reactions    Penicillins Other (See Comments)     Childhood allergy.  Unknown reaction       Objective   Vital Signs:   Vitals:    10/16/23 1325   BP: 130/70   BP Location: Left arm   Patient Position: Sitting   Pulse: 73   SpO2: 97%   Weight: 108 kg (238 lb)   Height: 182.9 cm (72\")       PHYSICAL EXAM  General appearance: Awake, alert, cooperative  Head: Normocephalic, without obvious abnormality, atraumatic  Eyes: Conjunctivae/corneas clear, EOMs intact  Neck: no adenopathy, no carotid bruit, no JVD, and thyroid: not enlarged  Lungs: clear to auscultation bilaterally and no rhonchi or crackles\", ' symmetric  Heart: regular rate and rhythm, S1, S2 normal, no murmur, click, rub or gallop  Abdomen: Soft, non-tender, bowel sounds normal,  no organomegaly  Extremities: extremities normal, atraumatic, no cyanosis or edema  Skin: Skin color, turgor normal, no rashes or lesions  Neurologic: Grossly normal     Lab Results   Component Value Date    GLUCOSE 134 (H) 06/23/2023    CALCIUM 9.8 06/23/2023     06/23/2023    K 5.5 (H) 06/23/2023    CO2 22.0 06/23/2023     06/23/2023    BUN 23 06/23/2023    CREATININE 1.24 06/23/2023    BCR 18.5 06/23/2023    ANIONGAP 12.0 06/23/2023     Lab Results   Component Value Date    WBC 9.62 06/23/2023    HGB 17.7 06/23/2023    HCT 52.2 (H) 06/23/2023    MCV 95.3 06/23/2023     06/23/2023     No results found for: \"INR\", \"PROTIME\"  Lab Results "   Component Value Date    TSH 2.160 12/13/2022       Cardiac Testing:     I personally viewed and interpreted the patient's EKG/Telemetry/lab data    Procedures    Tobacco Cessation: N/A  Obstructive Sleep Apnea Screening: Completed    Advance Care Planning   ACP discussion was declined by the patient. Patient does not have an advance directive, declines further assistance.       Assessment & Plan    Diagnoses and all orders for this visit:    1. Paroxysmal atrial flutter (Primary)         Diagnosis Plan   1. Paroxysmal atrial flutter  Typical right atrial flutter ablated.  No recurrence.    Monitors for atrial fibrillation now with his Fitbit EKG.  He has not had any documented history of atrial fibrillation.  He does have risk factors which include a prior history of atrial flutter as well as sleep apnea.  Sleep apnea is treated.    Presently no indication for anticoagulation.  Of course should he develop atrial fibrillation this would change.    Follow-up with his cardiologist Dr. Hagen.    Happy to see me again in an as needed fashion.        Body mass index is 32.28 kg/m².    I spent 36 minutes in consultation with this patient which included more than 65% of this time in direct face-to-face counseling, physical examination and discussion of my assessment and findings and this shared decision making with the patient.  The remainder of the time not spent face-to-face was performing one, some or all of the following actions: preparing to see the patient (e.g. reviewing tests, prior clinicians' notes, etc), ordering medications, tests or procedures, coordination of care, discussion of the plan with other healthcare providers, documenting clinical information in epic as well as independently interpreting results and communication of these results to the patient family and/or caregiver(s).  Please note that this explicitly excludes time spent on other separate billable services such as performing procedures or test  interpretation, when applicable.      Follow Up:       Thank you for allowing me to participate in the care of your patient. Please to not hesitate to contact me with additional questions or concerns.      Roc Shaffer DO, FACC, RS  Cardiac Electrophysiologist  [facility] / [facility]

## 2023-12-13 ENCOUNTER — OFFICE VISIT (OUTPATIENT)
Dept: CARDIOLOGY | Facility: CLINIC | Age: 73
End: 2023-12-13
Payer: MEDICARE

## 2023-12-13 VITALS
WEIGHT: 236 LBS | BODY MASS INDEX: 31.97 KG/M2 | SYSTOLIC BLOOD PRESSURE: 110 MMHG | HEIGHT: 72 IN | OXYGEN SATURATION: 97 % | DIASTOLIC BLOOD PRESSURE: 78 MMHG | HEART RATE: 79 BPM

## 2023-12-13 DIAGNOSIS — E78.2 MIXED HYPERLIPIDEMIA: ICD-10-CM

## 2023-12-13 DIAGNOSIS — I48.92 PAROXYSMAL ATRIAL FLUTTER: Primary | ICD-10-CM

## 2023-12-13 DIAGNOSIS — G47.33 OSA (OBSTRUCTIVE SLEEP APNEA): ICD-10-CM

## 2023-12-13 DIAGNOSIS — I10 HYPERTENSION, ESSENTIAL: ICD-10-CM

## 2023-12-13 DIAGNOSIS — Z79.899 LONG TERM CURRENT USE OF ANTIARRHYTHMIC DRUG: ICD-10-CM

## 2023-12-13 PROCEDURE — 3078F DIAST BP <80 MM HG: CPT | Performed by: INTERNAL MEDICINE

## 2023-12-13 PROCEDURE — 3074F SYST BP LT 130 MM HG: CPT | Performed by: INTERNAL MEDICINE

## 2023-12-13 PROCEDURE — 99214 OFFICE O/P EST MOD 30 MIN: CPT | Performed by: INTERNAL MEDICINE

## 2023-12-13 RX ORDER — ATORVASTATIN CALCIUM 20 MG/1
20 TABLET, FILM COATED ORAL DAILY
COMMUNITY

## 2023-12-13 RX ORDER — FAMOTIDINE 40 MG/1
40 TABLET, FILM COATED ORAL DAILY
COMMUNITY

## 2023-12-13 NOTE — PROGRESS NOTES
"OFFICE VISIT  NOTE  [facility] CARDIOLOGY      Name: Anibal Anderson    Date: 2023  MRN:  [id number]  :  1950      REFERRING/PRIMARY PROVIDER:  Freeman Smith MD    Chief Complaint   Patient presents with    Atrial Fibrillation       HPI: Anibal Anderson is a 73 y.o. male who presents today for paroxysmal atrial flutter.  Diagnosed at PCP 10/18/2022 started on metoprolol and Eliquis.  Status post MAIK/ECV 2022. history of hypertension, hyperlipidemia, CKD has 1 kidney.  Status post typical atrial flutter ablation by Dr. Shaffer 2023 and again on 2023.  Lexiscan stress test 2023 low risk, normal.  Doing well after ablation, denies palpitations.  No chest pain or shortness of breath, diagnosed with sleep apnea now on CPAP since December.      Past Medical History:   Diagnosis Date    Abnormal ECG     Arrhythmia     Arthritis     At risk for sleep apnea     sleep study today - tbd    Atrial fibrillation     Chronic kidney disease     one kidney - pcp watches it    CPAP (continuous positive airway pressure) dependence     Diabetes mellitus     \"prediabetic\"- doesnt check sugar    GERD (gastroesophageal reflux disease)     Headache     h/o    Hyperlipidemia     Hypertension     Kidney disease     One kidney    ELIEL (obstructive sleep apnea) 2023    Scab     right thigh-   scabbed over- froze area by pcp    SOBOE (shortness of breath on exertion)     Tinnitus     Wears glasses        Past Surgical History:   Procedure Laterality Date    ABLATION OF DYSRHYTHMIC FOCUS  23    CARDIAC ELECTROPHYSIOLOGY PROCEDURE N/A 2023    Procedure: Ablation atrial flutter with Rhythmia. DNS meds.;  Surgeon: Roc Shaffer DO;  Location: [facility] INVASIVE LOCATION;  Service: Cardiovascular;  Laterality: N/A;    CARDIAC ELECTROPHYSIOLOGY PROCEDURE N/A 2023    Procedure: Ablation atrial flutter. Hold Flecainide x3 days prior. DNS Eliquis;  Surgeon: Roc Shaffer DO;  " Location: [facility] INVASIVE LOCATION;  Service: Cardiovascular;  Laterality: N/A;    CARDIOVERSION      CARPAL TUNNEL RELEASE Bilateral     x2    COLONOSCOPY      CYSTOSCOPY TRANSURETHRAL RESECTION OF PROSTATE      ENDOSCOPY      NEPHRECTOMY Right     TENDON REPAIR Right     quad    WISDOM TOOTH EXTRACTION         Social History     Socioeconomic History    Marital status:    Tobacco Use    Smoking status: Never     Passive exposure: Past    Smokeless tobacco: Never   Vaping Use    Vaping Use: Never used   Substance and Sexual Activity    Alcohol use: Yes     Comment: Occasionally 1 or 2 beers    Drug use: Never    Sexual activity: Not Currently     Partners: Female       Family History   Problem Relation Age of Onset    Heart disease Mother     Cancer Father     Thyroid disease Father     Diabetes Father     Obesity Brother     Heart disease Brother     Heart disease Brother         ROS:   Constitutional no fever,  no weight loss   Skin no rash, no subcutaneous nodules   Otolaryngeal no difficulty swallowing   Cardiovascular See HPI   Pulmonary no cough, no sputum production   Gastrointestinal no constipation, no diarrhea   Genitourinary no dysuria, no hematuria   Hematologic no easy bruisability, no abnormal bleeding   Musculoskeletal no muscle pain   Neurologic no dizziness, no falls         Allergies   Allergen Reactions    Penicillins Other (See Comments)     Childhood allergy.  Unknown reaction         Current Outpatient Medications:     amLODIPine (NORVASC) 2.5 MG tablet, Take 1 tablet by mouth Daily., Disp: , Rfl:     ascorbic acid (VITAMIN C) 1000 MG tablet, Take 1 tablet by mouth Daily., Disp: , Rfl:     atorvastatin (LIPITOR) 20 MG tablet, Take 1 tablet by mouth Daily., Disp: , Rfl:     cholecalciferol (VITAMIN D3) 25 MCG (1000 UT) tablet, Take 2 tablets by mouth Daily., Disp: , Rfl:     famotidine (PEPCID) 40 MG tablet, Take 1 tablet by mouth Daily., Disp: , Rfl:     fluocinonide (LIDEX) 0.05 %  "cream, Apply 1 application  topically to the appropriate area as directed As Needed., Disp: , Rfl:     folic acid (FOLVITE) 1 MG tablet, Take 1 tablet by mouth Daily., Disp: , Rfl:     lisinopril (PRINIVIL,ZESTRIL) 20 MG tablet, Take 1 tablet by mouth Daily., Disp: 90 tablet, Rfl: 3    metFORMIN (GLUCOPHAGE) 850 MG tablet, Take 1 tablet by mouth 2 (Two) Times a Day With Meals. For Triglycerides, Disp: , Rfl:     temazepam (RESTORIL) 15 MG capsule, As Needed., Disp: , Rfl:     Triamcinolone Acetonide (NASACORT) 55 MCG/ACT nasal inhaler, 2 sprays into the nostril(s) as directed by provider Daily., Disp: , Rfl:     zolpidem (AMBIEN) 10 MG tablet, Take 1 tablet by mouth At Night As Needed for Sleep., Disp: , Rfl:     Vitals:    12/13/23 1438   BP: 110/78   BP Location: Right arm   Patient Position: Sitting   Pulse: 79   SpO2: 97%   Weight: 107 kg (236 lb)   Height: 182.9 cm (72\")       Body mass index is 32.01 kg/m².    PHYSICAL EXAM:    General Appearance:   well developed  well nourished  HENT:   oropharynx moist  lips not cyanotic  Neck:  thyroid not enlarged  supple  Respiratory:  no respiratory distress  normal breath sounds  no rales  Cardiovascular:  no jugular venous distention  regular rhythm  apical impulse normal  S1 normal, S2 normal  no S3, no S4   no murmur  no rub, no thrill  carotid pulses normal; no bruit  lower extremity edema: none      Musculoskeletal:  no clubbing of fingers.   normocephalic, head atraumatic  Skin:   warm, dry  Psychiatric:  judgement and insight appropriate  normal mood and affect    RESULTS:   Procedures    Results for orders placed during the hospital encounter of 11/02/22    Adult Transesophageal Echo (MAIK) W/ Cont if Necessary Per Protocol    Interpretation Summary    Left ventricular ejection fraction appears to be 56 - 60%.    Mild left atrial enlargement    Normal left atrial appendage with no evidence of thrombus.    I supervised and directed an independent trained observer " "with the assistance of monitoring the patient's level of consciousness and physiological status throughout the procedure.  Intraoperative service time of 28 minutes        Labs:  Lab Results   Component Value Date    CHOL 137 12/13/2022    TRIG 165 (H) 12/13/2022    HDL 35 (L) 12/13/2022    LDL 74 12/13/2022    AST 20 12/13/2022    ALT 19 12/13/2022     No results found for: \"HGBA1C\"  No components found for: \"CREATINININE\"  No results found for: \"EGFRIFNONA\"    Most recent PCP note, imaging tests, and labs reviewed.    ASSESSMENT:  Problem List Items Addressed This Visit       Paroxysmal atrial flutter - Primary    Overview     MAIK with ECV, 11/2/2022: Left ventricular ejection fraction appears to be 56 - 60%. Mild left atrial enlargement  ECV to sinus rhythm, 12/13/2022  MPS, 1/5/2023:  Myocardial perfusion imaging indicates a normal myocardial perfusion study with no evidence of ischemia. Left ventricular ejection fraction is normal.  Catheter ablation of typical CTI dependent atrial flutter, 2/22/2023         Hypertension, essential    Mixed hyperlipidemia    Relevant Medications    atorvastatin (LIPITOR) 20 MG tablet    RESOLVED: Long term current use of antiarrhythmic drug    ELIEL (obstructive sleep apnea)       PLAN:    1.  Typical atrial flutter:  Status post ablation 2/22/2023 with redo 6/2023.  With Dr. Shaffer  Now off Eliquis and flecainide per Dr. Shaffer recommendations.  Doing well maintaining sinus rhythm.    2.  Hypertension:  Long-term goal blood pressure less than 130/80  Continue lisinopril 20 mg daily.    3.  Hyperlipidemia:  Goal LDL less than 100  Increase aerobic exercise and low saturated fat diet recommended    4.  CKD stage II:  Has 1 kidney, monitor renal function closely with addition of flecainide    5.  ELIEL:  Encouraged him to continue compliance with CPAP.    Advance Care Planning   ACP discussion was held with the patient during this visit. Patient does not have an advance directive, " information provided.         Return to clinic in 9 months, or sooner as needed.    Thank you for the opportunity to share in the care of your patient; please do not hesitate to call me with any questions.     Louis Hagen MD, [facility]  Office: [phone] [address] [place] [address]    12/13/23

## 2024-03-06 RX ORDER — LISINOPRIL 20 MG/1
20 TABLET ORAL DAILY
Qty: 90 TABLET | Refills: 3 | Status: SHIPPED | OUTPATIENT
Start: 2024-03-06

## 2024-07-01 ENCOUNTER — OFFICE VISIT (OUTPATIENT)
Dept: CARDIOLOGY | Facility: CLINIC | Age: 74
End: 2024-07-01
Payer: MEDICARE

## 2024-07-01 VITALS
DIASTOLIC BLOOD PRESSURE: 70 MMHG | SYSTOLIC BLOOD PRESSURE: 122 MMHG | BODY MASS INDEX: 32.75 KG/M2 | OXYGEN SATURATION: 96 % | WEIGHT: 241.8 LBS | HEART RATE: 71 BPM | HEIGHT: 72 IN

## 2024-07-01 DIAGNOSIS — G47.33 OSA (OBSTRUCTIVE SLEEP APNEA): ICD-10-CM

## 2024-07-01 DIAGNOSIS — I10 HYPERTENSION, ESSENTIAL: ICD-10-CM

## 2024-07-01 DIAGNOSIS — I48.92 PAROXYSMAL ATRIAL FLUTTER: Primary | ICD-10-CM

## 2024-07-01 PROBLEM — Z79.01 CHRONIC ANTICOAGULATION: Status: RESOLVED | Noted: 2023-01-23 | Resolved: 2024-07-01

## 2024-07-01 PROCEDURE — 1159F MED LIST DOCD IN RCRD: CPT | Performed by: PHYSICIAN ASSISTANT

## 2024-07-01 PROCEDURE — 3074F SYST BP LT 130 MM HG: CPT | Performed by: PHYSICIAN ASSISTANT

## 2024-07-01 PROCEDURE — 99213 OFFICE O/P EST LOW 20 MIN: CPT | Performed by: PHYSICIAN ASSISTANT

## 2024-07-01 PROCEDURE — 1160F RVW MEDS BY RX/DR IN RCRD: CPT | Performed by: PHYSICIAN ASSISTANT

## 2024-07-01 PROCEDURE — 93000 ELECTROCARDIOGRAM COMPLETE: CPT | Performed by: PHYSICIAN ASSISTANT

## 2024-07-01 PROCEDURE — 3078F DIAST BP <80 MM HG: CPT | Performed by: PHYSICIAN ASSISTANT

## 2024-07-01 NOTE — PROGRESS NOTES
Encounter Date:07/01/2024      Patient ID: Anibal Anderson is a 74 y.o. male.    Freeman Smith MD    Cheif Complaint EP: Atrial Fibrillation    PROBLEM LIST:  Patient Active Problem List    Diagnosis Date Noted   • Paroxysmal atrial flutter 10/24/2022     Priority: High     Note Last Updated: 6/5/2023     MAIK with ECV, 11/2/2022: Left ventricular ejection fraction appears to be 56 - 60%. Mild left atrial enlargement  ECV to sinus rhythm, 12/13/2022  MPS, 1/5/2023:  Myocardial perfusion imaging indicates a normal myocardial perfusion study with no evidence of ischemia. Left ventricular ejection fraction is normal.  Catheter ablation of typical CTI dependent atrial flutter, 2/22/2023     • ELIEL (obstructive sleep apnea) 06/22/2023     Priority: Low   • Hypertension, essential 10/24/2022     Priority: Low   • Mixed hyperlipidemia 10/24/2022   • Age-related cataract of both eyes 10/19/2022               History of Present Illness  Patient presents today for follow-up with a history of paroxysmal atrial flutter status post ration of CTI dependent flutter.  He returns today for 1 year follow-up.  He has done very well.  He has no awareness of palpitations, no dizziness no syncope.  He continues to use his CPAP but admits to difficulty keeping his mask on at night.  He does not regularly check his blood pressure at home.  He states compliance with his current medical regimen reports no significant adverse side effects.    Allergies   Allergen Reactions   • Penicillins Other (See Comments)     Childhood allergy.  Unknown reaction       Current Outpatient Medications   Medication Instructions   • amLODIPine (NORVASC) 2.5 mg, Oral, Daily   • ascorbic acid (VITAMIN C) 1,000 mg, Oral, Daily   • atorvastatin (LIPITOR) 20 mg, Oral, Daily   • cholecalciferol (VITAMIN D3) 2,000 Units, Oral, Daily   • famotidine (PEPCID) 40 mg, Oral, Daily   • folic acid (FOLVITE) 1 mg, Oral, Daily   • lisinopril (PRINIVIL,ZESTRIL)  "20 mg, Oral, Daily   • metFORMIN (GLUCOPHAGE) 850 mg, Oral, 2 Times Daily With Meals, For Triglycerides    • temazepam (RESTORIL) 15 MG capsule As Needed.   • Triamcinolone Acetonide (NASACORT) 55 MCG/ACT nasal inhaler 2 sprays, Nasal, Daily   • zolpidem (AMBIEN) 10 mg, Oral, Nightly PRN       .    Objective:     /70 (BP Location: Right arm, Patient Position: Sitting)   Pulse 71   Ht 182.9 cm (72\")   Wt 110 kg (241 lb 12.8 oz)   SpO2 96%   BMI 32.79 kg/m²    Body mass index is 32.79 kg/m².     Vitals reviewed.   Constitutional:       Appearance: Well-developed.   Pulmonary:      Effort: Pulmonary effort is normal. No respiratory distress.      Breath sounds: Normal breath sounds. No wheezing. No rales.      Comments: Bases clear  Chest:      Chest wall: Not tender to palpatation.   Cardiovascular:      Normal rate. Regular rhythm.      Murmurs: There is no murmur.      No gallop.  No click. No rub.   Pulses:     Intact distal pulses.   Edema:     Peripheral edema absent.   Musculoskeletal: Normal range of motion.       Lab Review:             ECG 12 Lead    Date/Time: 7/1/2024 10:17 AM  Performed by: Primo Koroma PA    Authorized by: Primo Koroma PA  Rhythm: sinus rhythm  Ectopy: atrial premature contractions  Rate: normal  BPM: 71  Conduction: right bundle branch block  ST Segments: ST segments normal  T Waves: T waves normal  QRS axis: normal    Clinical impression: abnormal EKG                   Assessment:      Diagnosis Plan   1. Paroxysmal atrial flutter  Maintaining sinus rhythm, no known recurrence of atrial flutter      2. Hypertension, essential  Well managed, continue current medical regimen      3. ELIEL (obstructive sleep apnea)  CPAP compliant though struggling with mask fit.  We discussed referral for an inspire.  He will think about it and let me know.        Plan:     Advance Care Planning   ACP discussion was declined by the patient. Patient has an advance directive (not in " EMR), copy requested.           Stable cardiac status.  Continue current medications.   Continue follow-up with primary cardiology.  Follow-up with our service as needed,  Thank you for allowing us to participate in the care of your patient.     Electronically signed by JUDE Epstein, 07/01/24, 10:15 AM EDT.

## 2024-08-13 ENCOUNTER — APPOINTMENT (OUTPATIENT)
Dept: GENERAL RADIOLOGY | Facility: HOSPITAL | Age: 74
End: 2024-08-13
Payer: MEDICARE

## 2024-08-13 ENCOUNTER — HOSPITAL ENCOUNTER (EMERGENCY)
Facility: HOSPITAL | Age: 74
Discharge: HOME | End: 2024-08-13
Attending: EMERGENCY MEDICINE
Payer: MEDICARE

## 2024-08-13 VITALS
HEIGHT: 72 IN | SYSTOLIC BLOOD PRESSURE: 120 MMHG | RESPIRATION RATE: 16 BRPM | WEIGHT: 236 LBS | DIASTOLIC BLOOD PRESSURE: 83 MMHG | HEART RATE: 62 BPM | OXYGEN SATURATION: 96 % | TEMPERATURE: 98 F | BODY MASS INDEX: 31.97 KG/M2

## 2024-08-13 DIAGNOSIS — R07.9 CHEST PAIN, UNSPECIFIED TYPE: Primary | ICD-10-CM

## 2024-08-13 DIAGNOSIS — K21.9 GASTROESOPHAGEAL REFLUX DISEASE, UNSPECIFIED WHETHER ESOPHAGITIS PRESENT: ICD-10-CM

## 2024-08-13 LAB
ALBUMIN SERPL-MCNC: 4.2 G/DL (ref 3.5–5.2)
ALBUMIN/GLOB SERPL: 1.6 G/DL
ALP SERPL-CCNC: 79 U/L (ref 39–117)
ALT SERPL W P-5'-P-CCNC: 22 U/L (ref 1–41)
ANION GAP SERPL CALCULATED.3IONS-SCNC: 7 MMOL/L (ref 5–15)
AST SERPL-CCNC: 19 U/L (ref 1–40)
BASOPHILS # BLD AUTO: 0.07 10*3/MM3 (ref 0–0.2)
BASOPHILS NFR BLD AUTO: 0.9 % (ref 0–1.5)
BILIRUB SERPL-MCNC: 0.7 MG/DL (ref 0–1.2)
BUN SERPL-MCNC: 17 MG/DL (ref 8–23)
BUN/CREAT SERPL: 14.8 (ref 7–25)
CALCIUM SPEC-SCNC: 10.6 MG/DL (ref 8.6–10.5)
CHLORIDE SERPL-SCNC: 103 MMOL/L (ref 98–107)
CO2 SERPL-SCNC: 27 MMOL/L (ref 22–29)
CREAT SERPL-MCNC: 1.15 MG/DL (ref 0.76–1.27)
DEPRECATED RDW RBC AUTO: 42 FL (ref 37–54)
EGFRCR SERPLBLD CKD-EPI 2021: 67.6 ML/MIN/1.73
EOSINOPHIL # BLD AUTO: 0.14 10*3/MM3 (ref 0–0.4)
EOSINOPHIL NFR BLD AUTO: 1.7 % (ref 0.3–6.2)
ERYTHROCYTE [DISTWIDTH] IN BLOOD BY AUTOMATED COUNT: 12.1 % (ref 12.3–15.4)
GEN 5 2HR TROPONIN T REFLEX: 18 NG/L
GLOBULIN UR ELPH-MCNC: 2.7 GM/DL
GLUCOSE SERPL-MCNC: 127 MG/DL (ref 65–99)
HCT VFR BLD AUTO: 49.8 % (ref 37.5–51)
HGB BLD-MCNC: 17.1 G/DL (ref 13–17.7)
HOLD SPECIMEN: NORMAL
IMM GRANULOCYTES # BLD AUTO: 0.05 10*3/MM3 (ref 0–0.05)
IMM GRANULOCYTES NFR BLD AUTO: 0.6 % (ref 0–0.5)
LIPASE SERPL-CCNC: 26 U/L (ref 13–60)
LYMPHOCYTES # BLD AUTO: 1.08 10*3/MM3 (ref 0.7–3.1)
LYMPHOCYTES NFR BLD AUTO: 13.4 % (ref 19.6–45.3)
MCH RBC QN AUTO: 32.6 PG (ref 26.6–33)
MCHC RBC AUTO-ENTMCNC: 34.3 G/DL (ref 31.5–35.7)
MCV RBC AUTO: 95 FL (ref 79–97)
MONOCYTES # BLD AUTO: 0.59 10*3/MM3 (ref 0.1–0.9)
MONOCYTES NFR BLD AUTO: 7.3 % (ref 5–12)
NEUTROPHILS NFR BLD AUTO: 6.15 10*3/MM3 (ref 1.7–7)
NEUTROPHILS NFR BLD AUTO: 76.1 % (ref 42.7–76)
NRBC BLD AUTO-RTO: 0 /100 WBC (ref 0–0.2)
NT-PROBNP SERPL-MCNC: 75.7 PG/ML (ref 0–900)
PLATELET # BLD AUTO: 168 10*3/MM3 (ref 140–450)
PMV BLD AUTO: 9.7 FL (ref 6–12)
POTASSIUM SERPL-SCNC: 4.4 MMOL/L (ref 3.5–5.2)
PROT SERPL-MCNC: 6.9 G/DL (ref 6–8.5)
RBC # BLD AUTO: 5.24 10*6/MM3 (ref 4.14–5.8)
SODIUM SERPL-SCNC: 137 MMOL/L (ref 136–145)
TROPONIN T DELTA: -4 NG/L
TROPONIN T SERPL HS-MCNC: 22 NG/L
WBC NRBC COR # BLD AUTO: 8.08 10*3/MM3 (ref 3.4–10.8)
WHOLE BLOOD HOLD COAG: NORMAL
WHOLE BLOOD HOLD SPECIMEN: NORMAL

## 2024-08-13 PROCEDURE — 83690 ASSAY OF LIPASE: CPT | Performed by: EMERGENCY MEDICINE

## 2024-08-13 PROCEDURE — 99284 EMERGENCY DEPT VISIT MOD MDM: CPT

## 2024-08-13 PROCEDURE — 93005 ELECTROCARDIOGRAM TRACING: CPT | Performed by: EMERGENCY MEDICINE

## 2024-08-13 PROCEDURE — 36415 COLL VENOUS BLD VENIPUNCTURE: CPT

## 2024-08-13 PROCEDURE — 80053 COMPREHEN METABOLIC PANEL: CPT | Performed by: EMERGENCY MEDICINE

## 2024-08-13 PROCEDURE — 71045 X-RAY EXAM CHEST 1 VIEW: CPT

## 2024-08-13 PROCEDURE — 83880 ASSAY OF NATRIURETIC PEPTIDE: CPT | Performed by: EMERGENCY MEDICINE

## 2024-08-13 PROCEDURE — 84484 ASSAY OF TROPONIN QUANT: CPT | Performed by: EMERGENCY MEDICINE

## 2024-08-13 PROCEDURE — 85025 COMPLETE CBC W/AUTO DIFF WBC: CPT | Performed by: EMERGENCY MEDICINE

## 2024-08-13 RX ORDER — SODIUM CHLORIDE 0.9 % (FLUSH) 0.9 %
10 SYRINGE (ML) INJECTION AS NEEDED
Status: DISCONTINUED | OUTPATIENT
Start: 2024-08-13 | End: 2024-08-13 | Stop reason: HOSPADM

## 2024-08-13 RX ORDER — SUCRALFATE 1 G/1
1 TABLET ORAL ONCE
Status: COMPLETED | OUTPATIENT
Start: 2024-08-13 | End: 2024-08-13

## 2024-08-13 RX ORDER — PANTOPRAZOLE SODIUM 20 MG/1
20 TABLET, DELAYED RELEASE ORAL DAILY
Qty: 30 TABLET | Refills: 0 | Status: SHIPPED | OUTPATIENT
Start: 2024-08-13

## 2024-08-13 RX ORDER — ASPIRIN 81 MG/1
324 TABLET, CHEWABLE ORAL ONCE
Status: COMPLETED | OUTPATIENT
Start: 2024-08-13 | End: 2024-08-13

## 2024-08-13 RX ORDER — ACETAMINOPHEN 500 MG
1000 TABLET ORAL ONCE
Status: COMPLETED | OUTPATIENT
Start: 2024-08-13 | End: 2024-08-13

## 2024-08-13 RX ADMIN — SUCRALFATE 1 G: 1 TABLET ORAL at 09:49

## 2024-08-13 RX ADMIN — ASPIRIN 81 MG 324 MG: 81 TABLET ORAL at 09:52

## 2024-08-13 RX ADMIN — ACETAMINOPHEN 1000 MG: 500 TABLET ORAL at 13:42

## 2024-08-13 NOTE — ED PROVIDER NOTES
Subjective   History of Present Illness  74-year-old male presents emergency department today with what started last night as indigestion and now having some left arm pain.  He was actually seen by his primary care doctor Dr. Smith this morning Dr. Smith called and states that feels like this is probably GI but wonders given to get him a cardiac workup here.  He had a negative stress test back in 2023.  2 ablations done by Dr. Quiles.  No prior stents.  He does have a history of hypertension takes medicine for cholesterol he is not a diabetic does not smoke.  [place] seem to make his pain little better through the night but this morning with left arm pain study should be seen or evaluated.  Does have a history of reflux is on a PPI.    History provided by:  Patient   used: No    Chest Pain  Pain location:  L chest and epigastric  Pain quality: dull    Pain radiates to:  Does not radiate  Pain severity:  Moderate  Onset quality:  Gradual  Duration:  6 hours  Timing:  Intermittent  Progression:  Waxing and waning  Chronicity:  New  Context: not breathing, not lifting, not raising an arm, not at rest, not stress and not trauma    Relieved by:  Nothing  Worsened by:  Nothing  Ineffective treatments:  None tried  Associated symptoms: no abdominal pain, no anorexia, no claudication, no diaphoresis, no dysphagia, no fatigue, no fever, no heartburn, no lower extremity edema, no near-syncope, no orthopnea, no palpitations, no PND, no shortness of breath, no vomiting and no weakness    Risk factors: high cholesterol, hypertension and male sex    Risk factors: no aortic disease, no coronary artery disease, no diabetes mellitus, not obese and no smoking        Review of Systems   Constitutional:  Negative for diaphoresis, fatigue and fever.   HENT:  Negative for trouble swallowing.    Respiratory:  Negative for chest tightness, shortness of breath and wheezing.    Cardiovascular:  Positive for chest  "pain. Negative for palpitations, orthopnea, claudication, PND and near-syncope.   Gastrointestinal:  Negative for abdominal pain, anorexia, heartburn and vomiting.   Neurological:  Negative for weakness.       Past Medical History:   Diagnosis Date    Abnormal ECG     Arrhythmia     Arthritis     At risk for sleep apnea     sleep study today - tbd    Atrial fibrillation     Chronic kidney disease     one kidney - pcp watches it    CPAP (continuous positive airway pressure) dependence     Diabetes mellitus     \"prediabetic\"- doesnt check sugar    GERD (gastroesophageal reflux disease)     Headache     h/o    Hyperlipidemia     Hypertension     Kidney disease     One kidney    ELIEL (obstructive sleep apnea) 06/22/2023    Scab     right thigh-   scabbed over- froze area by pcp    SOBOE (shortness of breath on exertion)     Tinnitus     Wears glasses        Allergies   Allergen Reactions    Penicillins Other (See Comments)     Childhood allergy.  Unknown reaction       Past Surgical History:   Procedure Laterality Date    ABLATION OF DYSRHYTHMIC FOCUS  2/22/23    CARDIAC ELECTROPHYSIOLOGY PROCEDURE N/A 02/22/2023    Procedure: Ablation atrial flutter with Rhythmia. DNS meds.;  Surgeon: Roc Shaffer DO;  Location: [facility] EP INVASIVE LOCATION;  Service: Cardiovascular;  Laterality: N/A;    CARDIAC ELECTROPHYSIOLOGY PROCEDURE N/A 6/30/2023    Procedure: Ablation atrial flutter. Hold Flecainide x3 days prior. DNS Eliquis;  Surgeon: Roc Shaffer DO;  Location: [facility] EP INVASIVE LOCATION;  Service: Cardiovascular;  Laterality: N/A;    CARDIOVERSION      CARPAL TUNNEL RELEASE Bilateral     x2    COLONOSCOPY      CYSTOSCOPY TRANSURETHRAL RESECTION OF PROSTATE      ENDOSCOPY      NEPHRECTOMY Right     TENDON REPAIR Right     quad    WISDOM TOOTH EXTRACTION         Family History   Problem Relation Age of Onset    Heart disease Mother     Cancer Father     Thyroid disease Father     Diabetes Father     Obesity Brother     " Heart disease Brother     Heart disease Brother        Social History     Socioeconomic History    Marital status:    Tobacco Use    Smoking status: Never     Passive exposure: Past    Smokeless tobacco: Never   Vaping Use    Vaping status: Never Used   Substance and Sexual Activity    Alcohol use: Yes     Comment: Occasionally 1 or 2 beers    Drug use: Never    Sexual activity: Not Currently     Partners: Female           Objective   Physical Exam  Vitals and nursing note reviewed.   Constitutional:       Appearance: He is well-developed.   HENT:      Head: Normocephalic and atraumatic.      Right Ear: External ear normal.      Left Ear: External ear normal.      Nose: Nose normal.   Eyes:      General: No scleral icterus.     Conjunctiva/sclera: Conjunctivae normal.      Pupils: Pupils are equal, round, and reactive to light.   Neck:      Thyroid: No thyromegaly.   Cardiovascular:      Rate and Rhythm: Normal rate and regular rhythm. No extrasystoles are present.     Chest Wall: PMI is not displaced.      Heart sounds: Normal heart sounds.   Pulmonary:      Effort: Pulmonary effort is normal. No respiratory distress.      Breath sounds: Normal breath sounds. No wheezing or rales.   Chest:      Chest wall: No tenderness.   Abdominal:      General: Bowel sounds are normal. There is no distension.      Palpations: Abdomen is soft.      Tenderness: There is no abdominal tenderness.   Musculoskeletal:         General: Normal range of motion.      Cervical back: Normal range of motion.   Lymphadenopathy:      Cervical: No cervical adenopathy.   Skin:     General: Skin is warm and dry.   Neurological:      Mental Status: He is alert and oriented to person, place, and time.      Cranial Nerves: No cranial nerve deficit.      Coordination: Coordination normal.      Deep Tendon Reflexes: Reflexes are normal and symmetric. Reflexes normal.   Psychiatric:         Behavior: Behavior normal.         Thought Content:  Thought content normal.         Judgment: Judgment normal.         Procedures           ED Course                HEART Score: 3                  Recent Results (from the past 24 hour(s))   ECG 12 Lead ED Triage Standing Order; Chest Pain    Collection Time: 08/13/24  9:20 AM   Result Value Ref Range    QT Interval 416 ms    QTC Interval 452 ms   High Sensitivity Troponin T    Collection Time: 08/13/24  9:45 AM    Specimen: Blood   Result Value Ref Range    HS Troponin T 22 (H) <22 ng/L   Comprehensive Metabolic Panel    Collection Time: 08/13/24  9:45 AM    Specimen: Blood   Result Value Ref Range    Glucose 127 (H) 65 - 99 mg/dL    BUN 17 8 - 23 mg/dL    Creatinine 1.15 0.76 - 1.27 mg/dL    Sodium 137 136 - 145 mmol/L    Potassium 4.4 3.5 - 5.2 mmol/L    Chloride 103 98 - 107 mmol/L    CO2 27.0 22.0 - 29.0 mmol/L    Calcium 10.6 (H) 8.6 - 10.5 mg/dL    Total Protein 6.9 6.0 - 8.5 g/dL    Albumin 4.2 3.5 - 5.2 g/dL    ALT (SGPT) 22 1 - 41 U/L    AST (SGOT) 19 1 - 40 U/L    Alkaline Phosphatase 79 39 - 117 U/L    Total Bilirubin 0.7 0.0 - 1.2 mg/dL    Globulin 2.7 gm/dL    A/G Ratio 1.6 g/dL    BUN/Creatinine Ratio 14.8 7.0 - 25.0    Anion Gap 7.0 5.0 - 15.0 mmol/L    eGFR 67.6 >60.0 mL/min/1.73   Lipase    Collection Time: 08/13/24  9:45 AM    Specimen: Blood   Result Value Ref Range    Lipase 26 13 - 60 U/L   BNP    Collection Time: 08/13/24  9:45 AM    Specimen: Blood   Result Value Ref Range    proBNP 75.7 0.0 - 900.0 pg/mL   Green Top (Gel)    Collection Time: 08/13/24  9:45 AM   Result Value Ref Range    Extra Tube Hold for add-ons.    Lavender Top    Collection Time: 08/13/24  9:45 AM   Result Value Ref Range    Extra Tube hold for add-on    Gold Top - SST    Collection Time: 08/13/24  9:45 AM   Result Value Ref Range    Extra Tube Hold for add-ons.    Gray Top    Collection Time: 08/13/24  9:45 AM   Result Value Ref Range    Extra Tube Hold for add-ons.    Light Blue Top    Collection Time: 08/13/24  9:45 AM    Result Value Ref Range    Extra Tube Hold for add-ons.    CBC Auto Differential    Collection Time: 08/13/24  9:45 AM    Specimen: Blood   Result Value Ref Range    WBC 8.08 3.40 - 10.80 10*3/mm3    RBC 5.24 4.14 - 5.80 10*6/mm3    Hemoglobin 17.1 13.0 - 17.7 g/dL    Hematocrit 49.8 37.5 - 51.0 %    MCV 95.0 79.0 - 97.0 fL    MCH 32.6 26.6 - 33.0 pg    MCHC 34.3 31.5 - 35.7 g/dL    RDW 12.1 (L) 12.3 - 15.4 %    RDW-SD 42.0 37.0 - 54.0 fl    MPV 9.7 6.0 - 12.0 fL    Platelets 168 140 - 450 10*3/mm3    Neutrophil % 76.1 (H) 42.7 - 76.0 %    Lymphocyte % 13.4 (L) 19.6 - 45.3 %    Monocyte % 7.3 5.0 - 12.0 %    Eosinophil % 1.7 0.3 - 6.2 %    Basophil % 0.9 0.0 - 1.5 %    Immature Grans % 0.6 (H) 0.0 - 0.5 %    Neutrophils, Absolute 6.15 1.70 - 7.00 10*3/mm3    Lymphocytes, Absolute 1.08 0.70 - 3.10 10*3/mm3    Monocytes, Absolute 0.59 0.10 - 0.90 10*3/mm3    Eosinophils, Absolute 0.14 0.00 - 0.40 10*3/mm3    Basophils, Absolute 0.07 0.00 - 0.20 10*3/mm3    Immature Grans, Absolute 0.05 0.00 - 0.05 10*3/mm3    nRBC 0.0 0.0 - 0.2 /100 WBC   ECG 12 Lead ED Triage Standing Order; Chest Pain    Collection Time: 08/13/24 11:49 AM   Result Value Ref Range    QT Interval 414 ms    QTC Interval 423 ms   High Sensitivity Troponin T 2Hr    Collection Time: 08/13/24 11:55 AM    Specimen: Blood   Result Value Ref Range    HS Troponin T 18 <22 ng/L    Troponin T Delta -4 (L) >=-4 - <+4 ng/L     Note: In addition to lab results from this visit, the labs listed above may include labs taken at another facility or during a different encounter within the last 24 hours. Please correlate lab times with ED admission and discharge times for further clarification of the services performed during this visit.    XR Chest 1 View   Final Result   Impression:   No acute cardiopulmonary findings. Right hemidiaphragm elevation.         Electronically Signed: Rolando Ochoa MD     8/13/2024 10:29 AM EDT     Workstation ID: [id number]        Vitals:     08/13/24 1200 08/13/24 1230 08/13/24 1300 08/13/24 1330   BP: 118/76 130/84 141/91 120/83   BP Location:       Patient Position:       Pulse: 60 60 69 62   Resp:       Temp:       TempSrc:       SpO2: 96% 97% 97% 96%   Weight:       Height:         Medications   sodium chloride 0.9 % flush 10 mL (has no administration in time range)   aspirin chewable tablet 324 mg (324 mg Oral Given 8/13/24 0952)   sucralfate (CARAFATE) tablet 1 g (1 g Oral Given 8/13/24 0949)   acetaminophen (TYLENOL) tablet 1,000 mg (1,000 mg Oral Given 8/13/24 1342)     ECG/EMG Results (last 24 hours)       Procedure Component Value Units Date/Time    ECG 12 Lead ED Triage Standing Order; Chest Pain [[id number]] Collected: 08/13/24 0920     Updated: 08/13/24 0939     QT Interval 416 ms      QTC Interval 452 ms     Narrative:      Test Reason : ED Triage Standing Order~  Blood Pressure :   */*   mmHG  Vent. Rate :  71 BPM     Atrial Rate :  71 BPM     P-R Int : 156 ms          QRS Dur : 136 ms      QT Int : 416 ms       P-R-T Axes :   8   4   0 degrees     QTc Int : 452 ms    Sinus rhythm with premature atrial complexes  Right bundle branch block  Abnormal ECG  When compared with ECG of 22-FEB-2023 14:08,  premature atrial complexes are now present  AR interval has decreased    Referred By: GREGORY           Confirmed By:     ECG 12 Lead ED Triage Standing Order; Chest Pain [[id number]] Collected: 08/13/24 1149     Updated: 08/13/24 1156     QT Interval 414 ms      QTC Interval 423 ms     Narrative:      Test Reason : ED Triage Standing Order~  Blood Pressure :   */*   mmHG  Vent. Rate :  63 BPM     Atrial Rate :  63 BPM     P-R Int : 164 ms          QRS Dur : 130 ms      QT Int : 414 ms       P-R-T Axes :  10   6   4 degrees     QTc Int : 423 ms    Normal sinus rhythm  Right bundle branch block  Abnormal ECG  When compared with ECG of 13-AUG-2024 09:20, (Unconfirmed)  premature atrial complexes are no longer present    Referred By: valentin medel            Confirmed By:           ECG 12 Lead ED Triage Standing Order; Chest Pain   Preliminary Result   Test Reason : ED Triage Standing Order~   Blood Pressure :   */*   mmHG   Vent. Rate :  63 BPM     Atrial Rate :  63 BPM      P-R Int : 164 ms          QRS Dur : 130 ms       QT Int : 414 ms       P-R-T Axes :  10   6   4 degrees      QTc Int : 423 ms      Normal sinus rhythm   Right bundle branch block   Abnormal ECG   When compared with ECG of 13-AUG-2024 09:20, (Unconfirmed)   premature atrial complexes are no longer present      Referred By: ed md           Confirmed By:       ECG 12 Lead ED Triage Standing Order; Chest Pain   Preliminary Result   Test Reason : ED Triage Standing Order~   Blood Pressure :   */*   mmHG   Vent. Rate :  71 BPM     Atrial Rate :  71 BPM      P-R Int : 156 ms          QRS Dur : 136 ms       QT Int : 416 ms       P-R-T Axes :   8   4   0 degrees      QTc Int : 452 ms      Sinus rhythm with premature atrial complexes   Right bundle branch block   Abnormal ECG   When compared with ECG of 22-FEB-2023 14:08,   premature atrial complexes are now present   RI interval has decreased      Referred By: EDMD           Confirmed By:                       Medical Decision Making  Amount and/or Complexity of Data Reviewed  Labs: ordered.  Radiology: ordered. Decision-making details documented in ED Course.  ECG/medicine tests: ordered. Decision-making details documented in ED Course.    Risk  OTC drugs.  Prescription drug management.        Final diagnoses:   Chest pain, unspecified type   Gastroesophageal reflux disease, unspecified whether esophagitis present       ED Disposition  ED Disposition       ED Disposition   Discharge    Condition   Stable    Comment   --               [facility]  [address]  [facility] [address] [phone]        Freeman Smith MD  [address] [facility] [address]  [phone]                Medication List        New Prescriptions      pantoprazole 20 MG EC tablet  Commonly known as: PROTONIX  Take 1 tablet by mouth Daily.            Stop      famotidine 40 MG tablet  Commonly known as: PEPCID               Where to Get Your Medications        These medications were sent to [facility] [id number] - [place], [place] - [address] AT [facility] [address] - [phone]  - [phone] FX  [address], [facility] [address]      Phone: [phone]   pantoprazole 20 MG EC tablet            Jt Brar PA  08/13/24 7103

## 2024-08-15 LAB
QT INTERVAL: 414 MS
QT INTERVAL: 416 MS
QTC INTERVAL: 423 MS
QTC INTERVAL: 452 MS

## 2024-08-26 ENCOUNTER — TRANSCRIBE ORDERS (OUTPATIENT)
Dept: GENERAL RADIOLOGY | Facility: CLINIC | Age: 74
End: 2024-08-26
Payer: MEDICARE

## 2024-08-26 DIAGNOSIS — S80.02XA CONTUSION OF LEFT KNEE, INITIAL ENCOUNTER: Primary | ICD-10-CM

## 2024-09-10 ENCOUNTER — OFFICE VISIT (OUTPATIENT)
Dept: SLEEP MEDICINE | Facility: CLINIC | Age: 74
End: 2024-09-10
Payer: MEDICARE

## 2024-09-10 VITALS
OXYGEN SATURATION: 96 % | HEART RATE: 78 BPM | BODY MASS INDEX: 31.97 KG/M2 | WEIGHT: 236 LBS | SYSTOLIC BLOOD PRESSURE: 116 MMHG | DIASTOLIC BLOOD PRESSURE: 74 MMHG | TEMPERATURE: 98.4 F | HEIGHT: 72 IN

## 2024-09-10 DIAGNOSIS — F51.04 PSYCHOPHYSIOLOGICAL INSOMNIA: ICD-10-CM

## 2024-09-10 DIAGNOSIS — G47.33 OSA (OBSTRUCTIVE SLEEP APNEA): Primary | ICD-10-CM

## 2024-09-10 PROCEDURE — 3074F SYST BP LT 130 MM HG: CPT | Performed by: NURSE PRACTITIONER

## 2024-09-10 PROCEDURE — 99213 OFFICE O/P EST LOW 20 MIN: CPT | Performed by: NURSE PRACTITIONER

## 2024-09-10 PROCEDURE — 3078F DIAST BP <80 MM HG: CPT | Performed by: NURSE PRACTITIONER

## 2024-09-10 NOTE — PROGRESS NOTES
Chief Complaint:   Chief Complaint   Patient presents with    Follow-up    Sleep Apnea       HPI:    Anibal Anderson is a 74 y.o. male here for follow-up of sleep apnea.  Patient was last seen 8/15/2023.  Patient continues to do well with CPAP therapy.  Patient does well with Restoril and Ambien sparingly alternating this medications and never taken on the same night.  Patient does take these per PCP.  He does get 6 to 7 hours nightly and goes to sleep easily.  Patient has an [place] score of 7/24.  He does well with nasal mask and heated tubing and will continue therapy.        Current medications are:   Current Outpatient Medications:     amLODIPine (NORVASC) 2.5 MG tablet, Take 1 tablet by mouth Daily., Disp: , Rfl:     ascorbic acid (VITAMIN C) 1000 MG tablet, Take 1 tablet by mouth Daily., Disp: , Rfl:     atorvastatin (LIPITOR) 20 MG tablet, Take 1 tablet by mouth Daily., Disp: , Rfl:     cholecalciferol (VITAMIN D3) 25 MCG (1000 UT) tablet, Take 2 tablets by mouth Daily., Disp: , Rfl:     folic acid (FOLVITE) 1 MG tablet, Take 1 tablet by mouth Daily., Disp: , Rfl:     lisinopril (PRINIVIL,ZESTRIL) 20 MG tablet, TAKE ONE TABLET BY MOUTH DAILY, Disp: 90 tablet, Rfl: 3    metFORMIN (GLUCOPHAGE) 850 MG tablet, Take 1 tablet by mouth 2 (Two) Times a Day With Meals. For Triglycerides, Disp: , Rfl:     pantoprazole (PROTONIX) 20 MG EC tablet, Take 1 tablet by mouth Daily., Disp: 30 tablet, Rfl: 0    temazepam (RESTORIL) 15 MG capsule, As Needed., Disp: , Rfl:     Triamcinolone Acetonide (NASACORT) 55 MCG/ACT nasal inhaler, 2 sprays into the nostril(s) as directed by provider Daily., Disp: , Rfl:     zolpidem (AMBIEN) 10 MG tablet, Take 1 tablet by mouth At Night As Needed for Sleep., Disp: , Rfl: .      The patient's relevant past medical, surgical, family and social history were reviewed and updated in Epic as appropriate.       Review of Systems   Eyes:  Positive for visual disturbance.   Respiratory:  Positive  for apnea.    Cardiovascular:  Positive for chest pain and palpitations.   Psychiatric/Behavioral:  Positive for sleep disturbance.    All other systems reviewed and are negative.        Objective:    Physical Exam  Constitutional:       Appearance: Normal appearance.   HENT:      Head: Normocephalic and atraumatic.      Mouth/Throat:      Comments: Class 4 airway  Cardiovascular:      Rate and Rhythm: Normal rate and regular rhythm.   Pulmonary:      Effort: Pulmonary effort is normal.      Breath sounds: Normal breath sounds.   Skin:     General: Skin is warm and dry.   Neurological:      Mental Status: He is alert and oriented to person, place, and time.   Psychiatric:         Mood and Affect: Mood normal.         Behavior: Behavior normal.         Thought Content: Thought content normal.         Judgment: Judgment normal.         CPAP Report  30/30 days of use  Greater than 4-hour use 97%  Setting 8-18  95th percentile pressure 14.7  AHI of 2.3    The patient continues to use and benefit from CPAP therapy.    ASSESSMENT/PLAN    Diagnoses and all orders for this visit:    1. ELIEL (obstructive sleep apnea) (Primary)  -     PAP Therapy    2. Psychophysiological insomnia  Comments:  Medication per PCP        Counseled patient regarding multimodal approach with healthy nutrition, healthy sleep, regular physical activity, social activities, counseling, and medications. Encouraged to practice lateral sleep position. Avoid alcohol and sedatives close to bedtime.    Refill supplies x 1 year.  Return to clinic 1 year or sooner if symptoms warrant.      Signed by  KELSY Lua    September 10, 2024      CC: Freeman Smith MD         No ref. provider found

## 2024-09-11 ENCOUNTER — OFFICE VISIT (OUTPATIENT)
Dept: GASTROENTEROLOGY | Facility: CLINIC | Age: 74
End: 2024-09-11
Payer: MEDICARE

## 2024-09-11 VITALS
WEIGHT: 238.2 LBS | SYSTOLIC BLOOD PRESSURE: 136 MMHG | HEART RATE: 68 BPM | HEIGHT: 72 IN | BODY MASS INDEX: 32.26 KG/M2 | DIASTOLIC BLOOD PRESSURE: 86 MMHG | TEMPERATURE: 97.1 F

## 2024-09-11 DIAGNOSIS — K21.9 GASTROESOPHAGEAL REFLUX DISEASE, UNSPECIFIED WHETHER ESOPHAGITIS PRESENT: Primary | ICD-10-CM

## 2024-09-11 RX ORDER — PANTOPRAZOLE SODIUM 40 MG/1
40 TABLET, DELAYED RELEASE ORAL DAILY
Qty: 90 TABLET | Refills: 3 | Status: SHIPPED | OUTPATIENT
Start: 2024-09-11 | End: 2024-09-11 | Stop reason: SDUPTHER

## 2024-09-11 RX ORDER — PANTOPRAZOLE SODIUM 40 MG/1
40 TABLET, DELAYED RELEASE ORAL DAILY
Qty: 90 TABLET | Refills: 3 | Status: SHIPPED | OUTPATIENT
Start: 2024-09-11

## 2024-09-11 NOTE — PATIENT INSTRUCTIONS
Follow a diet as recommended by your health care provider. This may involve avoiding foods and drinks such as:  Coffee and tea (with or without caffeine).  Drinks that contain alcohol.  Energy drinks and sports drinks.  Carbonated drinks or sodas.  Chocolate and cocoa.  Peppermint and mint flavorings.  Garlic and onions.  Horseradish.  Spicy and acidic foods, including peppers, chili powder, lawler powder, vinegar, hot sauces, and barbecue sauce.  Citrus fruit juices and citrus fruits, such as oranges, norma, and limes.  Tomato-based foods, such as red sauce, chili, salsa, and pizza with red sauce.  Fried and fatty foods, such as donuts, french fries, potato chips, and high-fat dressings.    Eat small, frequent meals instead of large meals.  Avoid drinking large amounts of liquid with your meals.  Avoid eating meals during the 2-3 hours before bedtime.  Avoid lying down right after you eat.      Here is a list of low-FODMAP (fermentable oligosaccharides disaccharides monosaccharides and polyols) foods you can try eliminating from your diet, to see if it has any effect on your symptoms.    Fermentable: Wheat, barley, rye, onion, leak, white part of spring onion, garlic, shallots, artichokes, beet root, phenyl, peas, chicory, pistachio, cashews, legumes, lentils, and chickpeas  Oligosaccharides: Milk, custard, ice cream, and yogurt  Monosaccharides: Apples, pears, mangoes, cherries, watermelon, asparagus, sugar snap peas, honey, hypertense corn syrup  Polyols: Apples, pears, apricots, cherries, nectarines, peaches, plums, watermelon, mushrooms, cauliflower, artificially sweetened chewing gum and confectionary

## 2024-09-11 NOTE — PROGRESS NOTES
"    Office Note     Name: Anibal Anderson    : 1950     MRN: [id number]     Chief Complaint  Heartburn    Subjective     History of Present Illness:  Anibal Anderson is a 74 y.o. male who presents today for further evaluation of GERD, following recent ED evaluation  for epigastric and left-sided chest pain, with unremarkable cardiac workup and recent negative stress test.     He has history of aflutter, requiring multiple ablations and is followed by cardiology. He denies frequent reflux, and is mainly concerned about the epigastric pain. States it is somewhat improved with Nia-seltzer. He does endorse very rare dysphagia to solids. He's lost 4-5 pounds recently, intentionally. He denies any significant constipation, diarrhea, n/v, early satiety, or melena.      Was told after recent colonoscopy that he had suspected megacolon, records currently unavailable. He is a nonsmoker.     Past Medical History:   Past Medical History:   Diagnosis Date    Abnormal ECG     Arrhythmia     Arthritis     At risk for sleep apnea     sleep study today - tbd    Atrial fibrillation     Chronic kidney disease     one kidney - pcp watches it    CPAP (continuous positive airway pressure) dependence     Diabetes mellitus     \"prediabetic\"- doesnt check sugar    GERD (gastroesophageal reflux disease)     Headache     h/o    Hyperlipidemia     Hypertension     Kidney disease     One kidney    ELIEL (obstructive sleep apnea) 2023    Scab     right thigh-   scabbed over- froze area by pcp    SOBOE (shortness of breath on exertion)     Tinnitus     Wears glasses        Past Surgical History:   Past Surgical History:   Procedure Laterality Date    ABLATION OF DYSRHYTHMIC FOCUS  23    CARDIAC ELECTROPHYSIOLOGY PROCEDURE N/A 2023    Procedure: Ablation atrial flutter with Rhythmia. DNS meds.;  Surgeon: Roc Shaffer DO;  Location: [facility] INVASIVE LOCATION;  Service: Cardiovascular;  Laterality: N/A;    CARDIAC " ELECTROPHYSIOLOGY PROCEDURE N/A 6/30/2023    Procedure: Ablation atrial flutter. Hold Flecainide x3 days prior. DNS Eliquis;  Surgeon: Roc Shaffer DO;  Location: [facility] INVASIVE LOCATION;  Service: Cardiovascular;  Laterality: N/A;    CARDIOVERSION      CARPAL TUNNEL RELEASE Bilateral     x2    COLONOSCOPY      CYSTOSCOPY TRANSURETHRAL RESECTION OF PROSTATE      ENDOSCOPY      NEPHRECTOMY Right     TENDON REPAIR Right     quad    WISDOM TOOTH EXTRACTION         Immunizations:   Immunization History   Administered Date(s) Administered    ABRYSVO (RSV, 60+ or pregnant women 32-36 wks) 11/17/2023    COVID-19 (MODERNA) 1st,2nd,3rd Dose Monovalent 01/11/2021, 02/10/2021, 10/22/2021, 04/22/2022    COVID-19 (MODERNA) BIVALENT 12+YRS 09/20/2022    Fluzone High-Dose 65+yrs 11/17/2023    Influenza Injectable Mdck Pf Quad 08/11/2017    Pneumococcal Polysaccharide (PPSV23) 06/14/2023    Shingrix 04/25/2023, 07/22/2023        Medications:     Current Outpatient Medications:     amLODIPine (NORVASC) 2.5 MG tablet, Take 1 tablet by mouth Daily., Disp: , Rfl:     ascorbic acid (VITAMIN C) 1000 MG tablet, Take 1 tablet by mouth Daily., Disp: , Rfl:     atorvastatin (LIPITOR) 20 MG tablet, Take 1 tablet by mouth Daily., Disp: , Rfl:     cholecalciferol (VITAMIN D3) 25 MCG (1000 UT) tablet, Take 2 tablets by mouth Daily., Disp: , Rfl:     folic acid (FOLVITE) 1 MG tablet, Take 1 tablet by mouth Daily., Disp: , Rfl:     lisinopril (PRINIVIL,ZESTRIL) 20 MG tablet, TAKE ONE TABLET BY MOUTH DAILY, Disp: 90 tablet, Rfl: 3    metFORMIN (GLUCOPHAGE) 850 MG tablet, Take 1 tablet by mouth 2 (Two) Times a Day With Meals. For Triglycerides, Disp: , Rfl:     temazepam (RESTORIL) 15 MG capsule, As Needed., Disp: , Rfl:     Triamcinolone Acetonide (NASACORT) 55 MCG/ACT nasal inhaler, 2 sprays into the nostril(s) as directed by provider Daily., Disp: , Rfl:     zolpidem (AMBIEN) 10 MG tablet, Take 1 tablet by mouth At Night As Needed for  "Sleep., Disp: , Rfl:     pantoprazole (PROTONIX) 40 MG EC tablet, Take 1 tablet by mouth Daily., Disp: 90 tablet, Rfl: 3    Allergies:   Allergies   Allergen Reactions    Penicillins Other (See Comments)     Childhood allergy.  Unknown reaction       Family History:   Family History   Problem Relation Age of Onset    Heart disease Mother     Cancer Father     Thyroid disease Father     Diabetes Father     Obesity Brother     Heart disease Brother     Heart disease Brother        Social History:   Social History     Socioeconomic History    Marital status:    Tobacco Use    Smoking status: Never     Passive exposure: Past    Smokeless tobacco: Never   Vaping Use    Vaping status: Never Used   Substance and Sexual Activity    Alcohol use: Yes     Comment: Occasionally 1 or 2 beers    Drug use: Never    Sexual activity: Not Currently     Partners: Female         Objective     Vital Signs  /86 (BP Location: Left arm, Patient Position: Sitting, Cuff Size: Adult)   Pulse 68   Temp 97.1 °F (36.2 °C) (Temporal)   Ht 182.9 cm (72.01\")   Wt 108 kg (238 lb 3.2 oz)   BMI 32.30 kg/m²   Estimated body mass index is 32.3 kg/m² as calculated from the following:    Height as of this encounter: 182.9 cm (72.01\").    Weight as of this encounter: 108 kg (238 lb 3.2 oz).          Physical Exam  Vitals reviewed.   Constitutional:       General: He is awake.   Cardiovascular:      Rate and Rhythm: Normal rate.   Pulmonary:      Effort: Pulmonary effort is normal.   Abdominal:      General: Bowel sounds are normal.      Palpations: Abdomen is soft.      Tenderness: There is no abdominal tenderness.   Skin:     General: Skin is warm and dry.      Capillary Refill: Capillary refill takes less than 2 seconds.   Neurological:      Mental Status: He is alert.          Assessment and Plan     Assessment & Plan  Gastroesophageal reflux disease, unspecified whether esophagitis present  Increase protonix to 40 mg daily.   Written " GERD dietary recommendations provided.   If pain continues, despite PPI, will plan for EGD for further evaluation.      New Medications Ordered This Visit   Medications    pantoprazole (PROTONIX) 40 MG EC tablet     Sig: Take 1 tablet by mouth Daily.     Dispense:  90 tablet     Refill:  3          Follow Up  As needed pending response    KELSY Pena  [facility] [id number]  [facility] GASTROENTEROLOG[facility]  [address] [place] [id number]

## 2024-09-12 ENCOUNTER — TELEPHONE (OUTPATIENT)
Dept: GASTROENTEROLOGY | Facility: CLINIC | Age: 74
End: 2024-09-12
Payer: MEDICARE

## 2024-09-12 NOTE — TELEPHONE ENCOUNTER
----- Message from Elda Bowen sent at 9/11/2024  4:26 PM EDT -----  Regarding: colonoscopy records  [facility]. Would you please try to get records from recent colonoscopy at [facility]? Thanks!

## 2024-09-12 NOTE — TELEPHONE ENCOUNTER
I called [facility] And requested the colonoscopy report and pathology. Annita verbalized she would fax it to us today.

## 2024-12-16 ENCOUNTER — OFFICE VISIT (OUTPATIENT)
Dept: CARDIOLOGY | Facility: CLINIC | Age: 74
End: 2024-12-16
Payer: MEDICARE

## 2024-12-16 VITALS
HEIGHT: 72 IN | DIASTOLIC BLOOD PRESSURE: 80 MMHG | WEIGHT: 226.2 LBS | SYSTOLIC BLOOD PRESSURE: 136 MMHG | HEART RATE: 81 BPM | OXYGEN SATURATION: 95 % | BODY MASS INDEX: 30.64 KG/M2

## 2024-12-16 DIAGNOSIS — E78.2 MIXED HYPERLIPIDEMIA: ICD-10-CM

## 2024-12-16 DIAGNOSIS — I10 HYPERTENSION, ESSENTIAL: ICD-10-CM

## 2024-12-16 DIAGNOSIS — I48.92 PAROXYSMAL ATRIAL FLUTTER: Primary | ICD-10-CM

## 2024-12-16 PROCEDURE — 3079F DIAST BP 80-89 MM HG: CPT | Performed by: INTERNAL MEDICINE

## 2024-12-16 PROCEDURE — 3075F SYST BP GE 130 - 139MM HG: CPT | Performed by: INTERNAL MEDICINE

## 2024-12-16 PROCEDURE — 99214 OFFICE O/P EST MOD 30 MIN: CPT | Performed by: INTERNAL MEDICINE

## 2024-12-16 NOTE — PROGRESS NOTES
"OFFICE VISIT  NOTE  [facility] CARDIOLOGY      Name: Anibal Anderson    Date: 2024  MRN:  [id number]  :  1950      REFERRING/PRIMARY PROVIDER:  Freeman Smith MD    Chief Complaint   Patient presents with    Paroxysmal atrial flutter       HPI: Anibal Anderson is a 74 y.o. male who presents today for paroxysmal atrial flutter.  Diagnosed at PCP 10/18/2022 started on metoprolol and Eliquis.  Status post MAIK/ECV 2022. history of hypertension, hyperlipidemia, CKD has 1 kidney.  Status post typical atrial flutter ablation by Dr. Shaffer 2023 and again on 2023.  Lexiscan stress test 2023 low risk, normal.  Doing well after ablation, denies palpitations.  Also denies chest pain or shortness of breath.  Walking his new puppy and going to the gym 2 to 3 days/week.  Did not tolerate CPAP for ELIEL now using a dental appliance goes back to Dr. Jacob for reevaluation soon.      Past Medical History:   Diagnosis Date    Abnormal ECG     Arrhythmia     Arthritis     At risk for sleep apnea     sleep study today - tbd    Atrial fibrillation     Chronic kidney disease     one kidney - pcp watches it    CPAP (continuous positive airway pressure) dependence     Diabetes mellitus     \"prediabetic\"- doesnt check sugar    GERD (gastroesophageal reflux disease)     Headache     h/o    Hyperlipidemia     Hypertension     Kidney disease     One kidney    ELIEL (obstructive sleep apnea) 2023    Scab     right thigh-   scabbed over- froze area by pcp    SOBOE (shortness of breath on exertion)     Tinnitus     Wears glasses        Past Surgical History:   Procedure Laterality Date    ABLATION OF DYSRHYTHMIC FOCUS  23    CARDIAC ELECTROPHYSIOLOGY PROCEDURE N/A 2023    Procedure: Ablation atrial flutter with Rhythmia. DNS meds.;  Surgeon: Roc Shaffer DO;  Location: [facility] INVASIVE LOCATION;  Service: Cardiovascular;  Laterality: N/A;    CARDIAC ELECTROPHYSIOLOGY PROCEDURE " N/A 6/30/2023    Procedure: Ablation atrial flutter. Hold Flecainide x3 days prior. DNS Eliquis;  Surgeon: Roc Shaffer DO;  Location: [facility] INVASIVE LOCATION;  Service: Cardiovascular;  Laterality: N/A;    CARDIOVERSION      CARPAL TUNNEL RELEASE Bilateral     x2    COLONOSCOPY      CYSTOSCOPY TRANSURETHRAL RESECTION OF PROSTATE      ENDOSCOPY      NEPHRECTOMY Right     TENDON REPAIR Right     quad    WISDOM TOOTH EXTRACTION         Social History     Socioeconomic History    Marital status:    Tobacco Use    Smoking status: Never     Passive exposure: Past    Smokeless tobacco: Never   Vaping Use    Vaping status: Never Used   Substance and Sexual Activity    Alcohol use: Yes     Comment: Occasionally 1 or 2 beers    Drug use: Never    Sexual activity: Not Currently     Partners: Female       Family History   Problem Relation Age of Onset    Heart disease Mother     Cancer Father     Thyroid disease Father     Diabetes Father     Obesity Brother     Heart disease Brother     Heart disease Brother         ROS:   Constitutional no fever,  no weight loss   Skin no rash, no subcutaneous nodules   Otolaryngeal no difficulty swallowing   Cardiovascular See HPI   Pulmonary no cough, no sputum production   Gastrointestinal no constipation, no diarrhea   Genitourinary no dysuria, no hematuria   Hematologic no easy bruisability, no abnormal bleeding   Musculoskeletal no muscle pain   Neurologic no dizziness, no falls         Allergies   Allergen Reactions    Penicillins Other (See Comments)     Childhood allergy.  Unknown reaction         Current Outpatient Medications:     amLODIPine (NORVASC) 2.5 MG tablet, Take 1 tablet by mouth Daily., Disp: , Rfl:     ascorbic acid (VITAMIN C) 1000 MG tablet, Take 1 tablet by mouth Daily., Disp: , Rfl:     atorvastatin (LIPITOR) 20 MG tablet, Take 1 tablet by mouth Daily., Disp: , Rfl:     cholecalciferol (VITAMIN D3) 25 MCG (1000 UT) tablet, Take 2 tablets by mouth  "Daily., Disp: , Rfl:     folic acid (FOLVITE) 1 MG tablet, Take 1 tablet by mouth Daily., Disp: , Rfl:     lisinopril (PRINIVIL,ZESTRIL) 20 MG tablet, TAKE ONE TABLET BY MOUTH DAILY, Disp: 90 tablet, Rfl: 3    metFORMIN (GLUCOPHAGE) 850 MG tablet, Take 1 tablet by mouth 2 (Two) Times a Day With Meals. For Triglycerides, Disp: , Rfl:     pantoprazole (PROTONIX) 40 MG EC tablet, Take 1 tablet by mouth Daily., Disp: 90 tablet, Rfl: 3    temazepam (RESTORIL) 15 MG capsule, As Needed., Disp: , Rfl:     Triamcinolone Acetonide (NASACORT) 55 MCG/ACT nasal inhaler, Administer 2 sprays into the nostril(s) as directed by provider Daily., Disp: , Rfl:     zolpidem (AMBIEN) 10 MG tablet, Take 1 tablet by mouth At Night As Needed for Sleep., Disp: , Rfl:     Vitals:    12/16/24 1311   BP: 136/80   Pulse: 81   SpO2: 95%   Weight: 103 kg (226 lb 3.2 oz)   Height: 182.9 cm (72\")         Body mass index is 30.68 kg/m².    PHYSICAL EXAM:    General Appearance:   well developed  well nourished  HENT:   oropharynx moist  lips not cyanotic  Neck:  thyroid not enlarged  No carotid bruit on exam  Respiratory:  no respiratory distress  normal breath sounds  no rales  Cardiovascular:  no jugular venous distention  regular rhythm  apical impulse normal  S1 normal, S2 normal  no S3, no S4   no murmur  no rub, no thrill  carotid pulses normal; no bruit  lower extremity edema: none      Musculoskeletal:  no clubbing of fingers.   normocephalic, head atraumatic  Skin:   warm, dry  Psychiatric:  judgement and insight appropriate  normal mood and affect  I performed a physical examination today and reviewed the above copied physical examination, which was updated where appropriate and unchanged otherwise.  RESULTS:   Procedures    Results for orders placed during the hospital encounter of 11/02/22    Adult Transesophageal Echo (MAIK) W/ Cont if Necessary Per Protocol    Interpretation Summary    Left ventricular ejection fraction appears to be 56 - " "60%.    Mild left atrial enlargement    Normal left atrial appendage with no evidence of thrombus.    I supervised and directed an independent trained observer with the assistance of monitoring the patient's level of consciousness and physiological status throughout the procedure.  Intraoperative service time of 28 minutes        Labs:  Lab Results   Component Value Date    CHOL 137 12/13/2022    TRIG 165 (H) 12/13/2022    HDL 35 (L) 12/13/2022    LDL 74 12/13/2022    AST 19 08/13/2024    ALT 22 08/13/2024     No results found for: \"HGBA1C\"  No components found for: \"CREATINININE\"  No results found for: \"EGFRIFNONA\"    Most recent PCP note, imaging tests, and labs reviewed.  Reviewed ER records from 8/2020 including notes, individual and unique labs, and EKG personally reviewed and interpreted.    ASSESSMENT:  Problem List Items Addressed This Visit       Paroxysmal atrial flutter - Primary    Overview     MAIK with ECV, 11/2/2022: Left ventricular ejection fraction appears to be 56 - 60%. Mild left atrial enlargement  ECV to sinus rhythm, 12/13/2022  MPS, 1/5/2023:  Myocardial perfusion imaging indicates a normal myocardial perfusion study with no evidence of ischemia. Left ventricular ejection fraction is normal.  Catheter ablation of typical CTI dependent atrial flutter, 2/22/2023         Hypertension, essential    Mixed hyperlipidemia       PLAN:    1.  Typical atrial flutter:  Status post ablation 2/22/2023 with redo 6/2023.  With Dr. Shaffer  Now off Eliquis and flecainide per Dr. Shaffer's recommendation.  Doing well maintaining sinus rhythm.    2.  Hypertension:  Long-term goal blood pressure less than 130/80  Continue lisinopril 20 mg daily.    3.  Hyperlipidemia:  Goal LDL less than 100  Increase aerobic exercise and low saturated fat diet recommended    4.  CKD stage II:  Has 1 kidney, monitor renal function closely with addition of flecainide    5.  ELIEL:  Encouraged him to continue compliance with dental " appliance.    Advance Care Planning   ACP discussion was held with the patient during this visit. Patient does not have an advance directive, information provided.         Return to clinic in 12 months, or sooner as needed.    Thank you for the opportunity to share in the care of your patient; please do not hesitate to call me with any questions.     Louis Hagen MD, [facility]  Office: [phone] [address] [place] [address]    12/16/24

## 2025-02-21 RX ORDER — LISINOPRIL 20 MG/1
20 TABLET ORAL DAILY
Qty: 90 TABLET | Refills: 3 | Status: SHIPPED | OUTPATIENT
Start: 2025-02-21

## 2025-02-21 NOTE — TELEPHONE ENCOUNTER
Lab Results   Component Value Date    GLUCOSE 127 (H) 08/13/2024    CALCIUM 10.6 (H) 08/13/2024     08/13/2024    K 4.4 08/13/2024    CO2 27.0 08/13/2024     08/13/2024    BUN 17 08/13/2024    CREATININE 1.15 08/13/2024    EGFR 67.6 08/13/2024    BCR 14.8 08/13/2024    ANIONGAP 7.0 08/13/2024

## (undated) DEVICE — INTRO SHEATH ENGAGE W/50 GW .038 8F12

## (undated) DEVICE — INTRO STEER AGILIS NXT MED/CURL 8.5F

## (undated) DEVICE — LEX ELECTRO PHYSIOLOGY: Brand: MEDLINE INDUSTRIES, INC.

## (undated) DEVICE — ST EXT IV SMRTSTE 2VLV FIX M LL 6ML 41

## (undated) DEVICE — OPEN-IRRIGATION TUBING SET: Brand: METRIQ™ IRRIGATION TUBING SET

## (undated) DEVICE — LOCATION REFERENCE PATCH KIT: Brand: RHYTHMIA™ MAPPING SYSTEM

## (undated) DEVICE — CATH ULTRASND ECHO ACUNAV FOR ACUSON 8F 90CM

## (undated) DEVICE — DRSNG SURESITE123 4X4.8IN

## (undated) DEVICE — ST INF PRI SMRTSTE 20DRP 2VLV 24ML 117

## (undated) DEVICE — SET PRIMARY GRVTY 10DP MALE LL 104IN

## (undated) DEVICE — SOL NACL 0.9PCT 1000ML

## (undated) DEVICE — ADULT, W/LG. BACK PAD, RADIOTRANSPARENT ELEMENT AND LEAD WIRE COMPATIBLE W/: Brand: DEFIBRILLATION ELECTRODES

## (undated) DEVICE — CANN NASL CO2 DIVIDED A/

## (undated) DEVICE — INTRO SHEATH ENGAGE W/50 GW .038 9F12

## (undated) DEVICE — LIMB HOLDER, WRIST/ANKLE: Brand: DEROYAL

## (undated) DEVICE — ELECTRD RETRN/GRND MEGADYNE SGL/PLT W/CORD 9FT DISP

## (undated) DEVICE — ABLATION CATHETER: Brand: INTELLANAV MIFI™ OPEN-IRRIGATED

## (undated) DEVICE — Device: Brand: WEBSTER CS

## (undated) DEVICE — DECANT BG O JET